# Patient Record
Sex: FEMALE | Race: WHITE | Employment: STUDENT | URBAN - METROPOLITAN AREA
[De-identification: names, ages, dates, MRNs, and addresses within clinical notes are randomized per-mention and may not be internally consistent; named-entity substitution may affect disease eponyms.]

---

## 2017-04-05 ENCOUNTER — VIRTUAL VISIT (OUTPATIENT)
Dept: FAMILY MEDICINE | Facility: OTHER | Age: 32
End: 2017-04-05

## 2017-04-05 NOTE — PROGRESS NOTES
"Date:   Clinician: Opal Hernandez  Clinician NPI: [id number]  Patient: Vasquez Ortiz  Patient : 1985  Patient Address: [facility] [place] [address]  Patient Phone: [phone]  Visit Protocol: URI  Patient Summary:  Vasquez is a 31 year old ( : 1985 ) female who initiated a Zip for evaluation of bronchitis. When asked the question \"Do you have a [place] primary care physician?\", Vasquez responded \"I don't know\".     Her symptoms started gradually 48 hours ago and consist of cough, hoarse voice, post-nasal drainage, and sore throat.   She denies itchy eyes, rhinitis, headache, myalgias, dysphagia, dyspnea, vomiting, nausea, nasal congestion, chest pain, ear pain, petechial or purpuric rash, chills, facial pain or pressure, loss of appetite, fever, and malaise. She denies a history of facial surgery.    She has a moderately painful sore throat. The patient denies having white spots on the tonsils similar to a sample strep throat image provided. She has not been exposed to Strep. When asked to feel her neck she reported enlarged lymph nodes. Vasquez noted that the enlarged neck lymph nodes developed AFTER the onset of upper respiratory symptoms. She denies axillary lymphadenopathy.   Her moderately severe (cough every 5-10 minutes) productive cough is more bothersome at night. She believes the cough is caused by post-nasal drainage. Her cough produces green sputum. She experiences mild wheezing on a regular basis.    Vasquez denies having COPD or other chronic lung disease.   Pulse: self-reported pulse rate as: 13 beats in 10 seconds.    Weight (in lbs): 105   She states she is not pregnant and denies breastfeeding. She has menstruated in the past month.   Vasquez does not smoke or use smokeless tobacco.    MEDICATIONS:  No current medications , ALLERGIES:  NKDA   Clinician Response:  Dear Vasquez,  Based on the information you have provided, you likely have viral bronchitis.   I am " prescribing benzonatate (Tessalon Perles) 100 mg to treat your cough. Take one to two tablets by mouth three times a day as needed for cough. There are no refills with this prescription.   Unless your are allergic to the over-the-counter medication(s) below, I recommend using:   Acetaminophen (Tylenol), which helps to reduce your discomfort and fever. Take 1-2 pills every 4-6 hours. This is an over-the-counter medication that does not require a prescription.   Ibuprofen. Take 1-3 200mg tablets (200-600mg) every 8 hours to help with the discomfort. Make sure to take the ibuprofen with food. Do not exceed 2400mg in 24 hours. This is an over-the-counter medication that does not require a prescription.   Try the following to help with your throat pain and discomfort:     Use throat lozenges    Gargle with warm salt water (1/4 teaspoon of salt per 8 ounce glass of water).    Suck on frozen items such as Popsicles or ice cubes.     Call 911 or go to the emergency room if you feel that your throat is closing off, you suddenly develop a rash, you are unable to swallow fluids, you are drooling, or you are having difficulty breathing.  Follow up with your primary care clinician if your symptoms are not improving in 3-4 days.   Because your condition is most likely caused by a virus, antibiotics will not help you get better. Inappropriately treating a viral infection with antibiotics may cause harmful side-effects. In fact, antibiotics may make you feel worse.  For more information on why I am not prescribing antibiotics, please watch this video: Antibiotics Won't Help You.   Drink plenty of liquids, especially water and take time to rest your body. This may mean taking a nap or going to bed earlier. Your body is fighting a virus and liquids and rest will improve the pace of recovery. Remember to regularly wash your hands and avoid close contact with others to prevent spreading your condition.   Diagnosis: Viral Bronchitis  possible  Diagnosis ICD: J40  Prescription: benzonatate (Tessalon Perles) 100mg oral tablet 30 tablets, 5 days supply. Take one to two tablets by mouth three times a day as needed. disp. 30. Refills: 0, Refill as needed: no, Allow substitutions: yes

## 2019-03-18 ENCOUNTER — TELEPHONE (OUTPATIENT)
Dept: PSYCHIATRY | Facility: CLINIC | Age: 34
End: 2019-03-18

## 2019-03-19 ENCOUNTER — TELEPHONE (OUTPATIENT)
Dept: BEHAVIORAL HEALTH | Facility: CLINIC | Age: 34
End: 2019-03-19

## 2019-03-19 ENCOUNTER — OFFICE VISIT (OUTPATIENT)
Dept: PSYCHIATRY | Facility: CLINIC | Age: 34
End: 2019-03-19
Attending: PSYCHIATRY & NEUROLOGY
Payer: COMMERCIAL

## 2019-03-19 ENCOUNTER — HOSPITAL ENCOUNTER (INPATIENT)
Facility: CLINIC | Age: 34
LOS: 7 days | Discharge: HOME | DRG: 885 | End: 2019-03-26
Attending: EMERGENCY MEDICINE | Admitting: PSYCHIATRY & NEUROLOGY
Payer: COMMERCIAL

## 2019-03-19 VITALS — SYSTOLIC BLOOD PRESSURE: 125 MMHG | HEART RATE: 98 BPM | DIASTOLIC BLOOD PRESSURE: 90 MMHG

## 2019-03-19 DIAGNOSIS — R45.1 AGITATION: ICD-10-CM

## 2019-03-19 DIAGNOSIS — F31.60 BIPOLAR AFFECTIVE DISORDER, CURRENT EPISODE MIXED, CURRENT EPISODE SEVERITY UNSPECIFIED (H): Primary | ICD-10-CM

## 2019-03-19 DIAGNOSIS — Z72.89 SELF-INJURIOUS BEHAVIOR: ICD-10-CM

## 2019-03-19 DIAGNOSIS — F30.9 MANIA (H): Primary | ICD-10-CM

## 2019-03-19 LAB
ALBUMIN SERPL-MCNC: 4.6 G/DL (ref 3.4–5)
ALP SERPL-CCNC: 90 U/L (ref 40–150)
ALT SERPL W P-5'-P-CCNC: 24 U/L (ref 0–50)
AMPHETAMINES UR QL SCN: NEGATIVE
ANION GAP SERPL CALCULATED.3IONS-SCNC: 6 MMOL/L (ref 3–14)
AST SERPL W P-5'-P-CCNC: 19 U/L (ref 0–45)
BARBITURATES UR QL: NEGATIVE
BASOPHILS # BLD AUTO: 0 10E9/L (ref 0–0.2)
BASOPHILS NFR BLD AUTO: 0.6 %
BENZODIAZ UR QL: NEGATIVE
BILIRUB SERPL-MCNC: 0.4 MG/DL (ref 0.2–1.3)
BUN SERPL-MCNC: 5 MG/DL (ref 7–30)
CALCIUM SERPL-MCNC: 9.4 MG/DL (ref 8.5–10.1)
CANNABINOIDS UR QL SCN: NEGATIVE
CHLORIDE SERPL-SCNC: 105 MMOL/L (ref 94–109)
CO2 SERPL-SCNC: 31 MMOL/L (ref 20–32)
COCAINE UR QL: NEGATIVE
CREAT SERPL-MCNC: 0.79 MG/DL (ref 0.52–1.04)
DIFFERENTIAL METHOD BLD: NORMAL
EOSINOPHIL # BLD AUTO: 0.1 10E9/L (ref 0–0.7)
EOSINOPHIL NFR BLD AUTO: 1.8 %
ERYTHROCYTE [DISTWIDTH] IN BLOOD BY AUTOMATED COUNT: 12.4 % (ref 10–15)
ETHANOL UR QL SCN: NEGATIVE
GFR SERPL CREATININE-BSD FRML MDRD: >90 ML/MIN/{1.73_M2}
GLUCOSE SERPL-MCNC: 77 MG/DL (ref 70–99)
HCG UR QL: NEGATIVE
HCT VFR BLD AUTO: 41.1 % (ref 35–47)
HGB BLD-MCNC: 13.4 G/DL (ref 11.7–15.7)
IMM GRANULOCYTES # BLD: 0 10E9/L (ref 0–0.4)
IMM GRANULOCYTES NFR BLD: 0 %
LYMPHOCYTES # BLD AUTO: 1.8 10E9/L (ref 0.8–5.3)
LYMPHOCYTES NFR BLD AUTO: 35.4 %
MCH RBC QN AUTO: 30 PG (ref 26.5–33)
MCHC RBC AUTO-ENTMCNC: 32.6 G/DL (ref 31.5–36.5)
MCV RBC AUTO: 92 FL (ref 78–100)
MONOCYTES # BLD AUTO: 0.5 10E9/L (ref 0–1.3)
MONOCYTES NFR BLD AUTO: 9.7 %
NEUTROPHILS # BLD AUTO: 2.7 10E9/L (ref 1.6–8.3)
NEUTROPHILS NFR BLD AUTO: 52.5 %
NRBC # BLD AUTO: 0 10*3/UL
NRBC BLD AUTO-RTO: 0 /100
OPIATES UR QL SCN: NEGATIVE
PLATELET # BLD AUTO: 197 10E9/L (ref 150–450)
POTASSIUM SERPL-SCNC: 4 MMOL/L (ref 3.4–5.3)
PROT SERPL-MCNC: 7.7 G/DL (ref 6.8–8.8)
RBC # BLD AUTO: 4.46 10E12/L (ref 3.8–5.2)
SODIUM SERPL-SCNC: 142 MMOL/L (ref 133–144)
WBC # BLD AUTO: 5.1 10E9/L (ref 4–11)

## 2019-03-19 PROCEDURE — 81025 URINE PREGNANCY TEST: CPT | Performed by: FAMILY MEDICINE

## 2019-03-19 PROCEDURE — 12400001 ZZH R&B MH UMMC

## 2019-03-19 PROCEDURE — 80307 DRUG TEST PRSMV CHEM ANLYZR: CPT | Performed by: FAMILY MEDICINE

## 2019-03-19 PROCEDURE — 85025 COMPLETE CBC W/AUTO DIFF WBC: CPT | Performed by: EMERGENCY MEDICINE

## 2019-03-19 PROCEDURE — 80053 COMPREHEN METABOLIC PANEL: CPT | Performed by: EMERGENCY MEDICINE

## 2019-03-19 PROCEDURE — 99284 EMERGENCY DEPT VISIT MOD MDM: CPT | Mod: Z6 | Performed by: EMERGENCY MEDICINE

## 2019-03-19 PROCEDURE — 80320 DRUG SCREEN QUANTALCOHOLS: CPT | Performed by: FAMILY MEDICINE

## 2019-03-19 PROCEDURE — 99285 EMERGENCY DEPT VISIT HI MDM: CPT | Mod: 25

## 2019-03-19 RX ORDER — BENZTROPINE MESYLATE 0.5 MG/1
0.5 TABLET ORAL 2 TIMES DAILY PRN
Status: DISCONTINUED | OUTPATIENT
Start: 2019-03-19 | End: 2019-03-26 | Stop reason: HOSPADM

## 2019-03-19 RX ORDER — DIPHENHYDRAMINE HYDROCHLORIDE 50 MG/ML
50 INJECTION INTRAMUSCULAR; INTRAVENOUS EVERY 6 HOURS PRN
Status: DISCONTINUED | OUTPATIENT
Start: 2019-03-19 | End: 2019-03-21

## 2019-03-19 RX ORDER — TRAZODONE HYDROCHLORIDE 50 MG/1
50 TABLET, FILM COATED ORAL
Status: DISCONTINUED | OUTPATIENT
Start: 2019-03-19 | End: 2019-03-26 | Stop reason: HOSPADM

## 2019-03-19 RX ORDER — HYDROXYZINE HYDROCHLORIDE 25 MG/1
25 TABLET, FILM COATED ORAL EVERY 4 HOURS PRN
Status: DISCONTINUED | OUTPATIENT
Start: 2019-03-19 | End: 2019-03-26 | Stop reason: HOSPADM

## 2019-03-19 RX ORDER — LAMOTRIGINE 150 MG/1
175 TABLET ORAL 2 TIMES DAILY
Status: ON HOLD | COMMUNITY
End: 2019-03-26

## 2019-03-19 RX ORDER — LAMOTRIGINE 100 MG/1
350 TABLET ORAL DAILY
COMMUNITY
End: 2019-03-19

## 2019-03-19 RX ORDER — HALOPERIDOL 2 MG/1
2 TABLET ORAL AT BEDTIME
Qty: 14 TABLET | Refills: 0 | Status: ON HOLD | OUTPATIENT
Start: 2019-03-19 | End: 2019-03-26

## 2019-03-19 RX ORDER — HALOPERIDOL 1 MG/1
2 TABLET ORAL EVERY 6 HOURS PRN
Status: DISCONTINUED | OUTPATIENT
Start: 2019-03-19 | End: 2019-03-26 | Stop reason: HOSPADM

## 2019-03-19 RX ORDER — DIAZEPAM 5 MG
5-20 TABLET ORAL EVERY 30 MIN PRN
Status: DISCONTINUED | OUTPATIENT
Start: 2019-03-19 | End: 2019-03-20

## 2019-03-19 RX ORDER — DEXTROAMPHETAMINE SACCHARATE, AMPHETAMINE ASPARTATE, DEXTROAMPHETAMINE SULFATE AND AMPHETAMINE SULFATE 1.25; 1.25; 1.25; 1.25 MG/1; MG/1; MG/1; MG/1
10 TABLET ORAL DAILY PRN
Status: ON HOLD | COMMUNITY
End: 2019-03-26

## 2019-03-19 RX ORDER — ACETAMINOPHEN 325 MG/1
650 TABLET ORAL EVERY 4 HOURS PRN
Status: DISCONTINUED | OUTPATIENT
Start: 2019-03-19 | End: 2019-03-26 | Stop reason: HOSPADM

## 2019-03-19 RX ORDER — CLONAZEPAM 1 MG/1
1-1.5 TABLET ORAL DAILY PRN
Status: ON HOLD | COMMUNITY
End: 2019-03-26

## 2019-03-19 RX ORDER — HALOPERIDOL 0.5 MG/1
2 TABLET ORAL AT BEDTIME
Status: DISCONTINUED | OUTPATIENT
Start: 2019-03-19 | End: 2019-03-20

## 2019-03-19 RX ORDER — CLONAZEPAM 0.5 MG/1
0.5 TABLET ORAL AT BEDTIME
Status: DISCONTINUED | OUTPATIENT
Start: 2019-03-19 | End: 2019-03-26 | Stop reason: HOSPADM

## 2019-03-19 RX ORDER — HALOPERIDOL 5 MG/ML
5 INJECTION INTRAMUSCULAR EVERY 6 HOURS PRN
Status: DISCONTINUED | OUTPATIENT
Start: 2019-03-19 | End: 2019-03-26 | Stop reason: HOSPADM

## 2019-03-19 SDOH — HEALTH STABILITY: MENTAL HEALTH: HOW OFTEN DO YOU HAVE A DRINK CONTAINING ALCOHOL?: NEVER

## 2019-03-19 ASSESSMENT — ENCOUNTER SYMPTOMS
VOMITING: 0
ABDOMINAL PAIN: 0
AGITATION: 1
SHORTNESS OF BREATH: 0
FEVER: 0
HALLUCINATIONS: 0
NERVOUS/ANXIOUS: 1

## 2019-03-19 ASSESSMENT — MIFFLIN-ST. JEOR: SCORE: 1107.78

## 2019-03-19 NOTE — LETTER
March 19, 2019      RE: Vasquez Ortiz         To whom it may concern,    Vasquez Ortiz is a patient of mine. She is currently experiencing health problems that make her unable to participate in school activities. I advised her that, at a minimum, she needs to break from school for the remainder of the week (March 18 through 22). We meet again later this week to re-evaluate, as there is a possibility she will need more time off.    Thank you for your consideration and for accommodating Vasquez's needs.    Sincerely,          George Monteiro MD, MPH

## 2019-03-19 NOTE — LETTER
To whom this may concern,    Vasquez Ortiz was hospitalized at the [facility] from 3/19/2019 to 3/26/2019 for a medical condition. She will be able to return back to her schooling and clinical activities without restrictions on 4/1/2019.             Matthew Mir MD

## 2019-03-19 NOTE — PATIENT INSTRUCTIONS
- Start taking Haldol 2mg at bedtime.  - Continue taking lamotrigine. Clonazepam is also okay, but this is not a long term solution, as it long term use can lead to decreased effectiveness and addiction.  - Follow-up with me on Thursday at 10:30AM.  - Ideally a parent would be with you for the next several days (eg the next week).  - Please take a break from school for the remainder of the week. Focus on sleep and taking care of your mental health.    If for any reason you feel in danger of harming yourself, then come to the emergency room and/or call 911.    Thank you for coming to the PSYCHIATRY CLINIC.    Lab Testing:  If you had lab testing today and your results are reassuring or normal they will be mailed to you or sent through [facility] within 7 days.   If the lab tests need quick action we will call you with the results.  The phone number we will call with results is # [phone] (home) . If this is not the best number please call our clinic and change the number.    Medication Refills:  If you need any refills please call your pharmacy and they will contact us. Our fax number for refills is [phone]. Please allow three business for refill processing.   If you need to  your refill at a new pharmacy, please contact the new pharmacy directly. The new pharmacy will help you get your medications transferred.     Scheduling:  If you have any concerns about today's visit or wish to schedule another appointment please call our office during normal business hours [phone] (8-5:00 M-F)    Contact Us:  Please call [phone] during business hours (8-5:00 M-F).  If after clinic hours, or on the weekend, please call  [phone].    Financial Assistance [phone]  [facility] Billing [phone]  [facility]illing [phone]  Medical Records [phone]      MENTAL HEALTH CRISIS NUMBERS:  [facility]:   [facility] - [phone]   Crisis Residence [facility] - [facility] -  [phone]   Walk-In Counseling Center [facility] - [phone]   COPE 24/7 [facility] Mobile Team for Adults - [[phone]]; Child - [[phone]]        [facility]:   [facility] - [phone]   Walk-in counseling [facility] - [phone]   Walk-in counseling [facility] - [phone]   Crisis Residence [facility] - [phone]   Urgent Care Adult Mental Health:   --Drop-in, 24/7 crisis line, and [facility] Mobile Team [[phone]]    CRISIS TEXT LINE: Text [phone] from anywhere, anytime, any crisis 24/7;    OR SEE [email or web address]     Poison Control Center - [phone]    CHILD: [facility] needs assessment team - [phone]     [facility] Lifeline - [phone]; or [facility] Lifeline - [phone]    If you have a medical emergency please call 911or go to the nearest ER.                    _____________________________________________    Again thank you for choosing PSYCHIATRY CLINIC and please let us know how we can best partner with you to improve you and your family's health.  You may be receiving a survey in the mail regarding this appointment. We would love to have your feedback, both positive and negative, so please fill out the survey and return it using the provided envelope. The survey is done by an external company, so your answers are anonymous.
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
## 2019-03-19 NOTE — ED NOTES
Purged recently. Worsening impulse to self harm. Cut self with razor superficially this weekend. Using her rx'd clonopin regularly. Recent  Increase in her lamictal

## 2019-03-19 NOTE — ED PROVIDER NOTES
History     Chief Complaint   Patient presents with     Self Injury     cutting self, purged, attempted self harm a few times over the past few weeks; sent to ED from psych appt. Denies SI.     HPI  Drew Sambol is a 33 year old female who presents emergency department for medical clearance prior to psychiatric admission.  Patient was seen in psychiatry clinic today and they recommended admission for significant agitation, impulsive behavior, recent cutting.  Patient purged one time within the last 1-2 weeks.  She cut herself on her right lower leg with a razor 3 days ago.   had to restrain her to prevent her from cutting 10 days ago.  Patient reports recent panic attacks, feeling mood lability, significant anxiety, but denies suicidal ideation, homicidal ideation, auditory or visual hallucinations.  Denies significant alcohol or drug use.   provides much of the history as well.    I have reviewed the Medications, Allergies, Past Medical and Surgical History, and Social History in the Epic system.    History reviewed. No pertinent past medical history.    History reviewed. No pertinent surgical history.    History reviewed. No pertinent family history.    Social History     Tobacco Use     Smoking status: Former Smoker     Smokeless tobacco: Never Used   Substance Use Topics     Alcohol use: Yes     Frequency: Never     Comment: once a week       Review of Systems   Constitutional: Negative for fever.   Respiratory: Negative for shortness of breath.    Cardiovascular: Negative for chest pain.   Gastrointestinal: Negative for abdominal pain and vomiting.   Psychiatric/Behavioral: Positive for agitation and self-injury. Negative for hallucinations and suicidal ideas. The patient is nervous/anxious.        Physical Exam   BP: 109/79  Pulse: 91  Temp: 97.8  F (36.6  C)  Resp: 16  SpO2: 99 %      Physical Exam    GEN:  Alert, well developed, no acute distress  HEENT:  PERRL, EOMI, Mucous membranes are  moist.   Cardio:  RRR, no murmur, radial pulses equal bilaterally  PULM:  Lungs clear, good air movement, no wheezes, rales   Back exam:  No CVA tenderness  Musculoskeletal:  normal range of motion, no lower extremity swelling or calf tenderness  Neuro:  Alert and oriented X3, Follows commands, moving all extremities spontaneously   Skin:  Warm, dry, superficial cuts/abrasions on right lower leg, lateral aspect.  Psychiatric: Appears anxious, no suicidal ideation, homicidal ideation, auditory or visual hallucinations.  ED Course        Procedures             Critical Care time:  none  Labs are normal except as shown.    Labs Ordered and Resulted from Time of ED Arrival Up to the Time of Departure from the ED   COMPREHENSIVE METABOLIC PANEL - Abnormal; Notable for the following components:       Result Value    Urea Nitrogen 5 (*)     All other components within normal limits   DRUG ABUSE SCREEN 6 CHEM DEP URINE ([facility])   HCG QUALITATIVE URINE   CBC WITH PLATELETS DIFFERENTIAL            Assessments & Plan (with Medical Decision Making)   Patient was sent to the emergency department for medical clearance prior to psychiatric admission.  Please see the psychiatry clinic note from earlier today for further details regarding her psychiatric evaluation.  She has no acute medical problems that would prevent admission to psychiatry.  She is medically cleared for psychiatric admission.    I have reviewed the nursing notes.    I have reviewed the findings, diagnosis, plan and need for follow up with the patient.       Medication List      There are no discharge medications for this visit.         Final diagnoses:   Agitation       3/19/2019   [facility], [facility], EMERGENCY DEPARTMENT     Argelia Pantoja MD  03/19/19 [id number]

## 2019-03-19 NOTE — TELEPHONE ENCOUNTER
"S: Dr Monteiro called seeking a direct psych admit for pt who is currently in the Psych clinic, presenting w/ \"possible virgilio\".  B: Pt has been superficially cutting her leg. Pt has been \"overusing\" her klonopin which is rx'ed to her. No hx of psych admits. Hx of an eating d/o. No chronic med prob's.  A: denies SI, coop, vol, \"possibly manic\",   R: author informed Dr NORIEGA that intake will check bed availability and will call him back. mbw  Dr Monteiro called back saying pt purged a few days ago after not having done so in years. He said he now wants to have pt go through the er due to her cutting and purging. Pt needs MED CLEARANCE ONLY in the er, per Dr NORIEGA. Dr NORIEGA was informed that we currently are at [facility]. mbw  "

## 2019-03-20 LAB
CHOLEST SERPL-MCNC: 187 MG/DL
HDLC SERPL-MCNC: 89 MG/DL
LDLC SERPL CALC-MCNC: 85 MG/DL
NONHDLC SERPL-MCNC: 98 MG/DL
TRIGL SERPL-MCNC: 63 MG/DL
TSH SERPL DL<=0.005 MIU/L-ACNC: 3.64 MU/L (ref 0.4–4)

## 2019-03-20 PROCEDURE — 84443 ASSAY THYROID STIM HORMONE: CPT

## 2019-03-20 PROCEDURE — 80061 LIPID PANEL: CPT

## 2019-03-20 PROCEDURE — 99223 1ST HOSP IP/OBS HIGH 75: CPT | Mod: AI | Performed by: PSYCHIATRY & NEUROLOGY

## 2019-03-20 PROCEDURE — 93005 ELECTROCARDIOGRAM TRACING: CPT

## 2019-03-20 PROCEDURE — 25000132 ZZH RX MED GY IP 250 OP 250 PS 637

## 2019-03-20 PROCEDURE — 93010 ELECTROCARDIOGRAM REPORT: CPT | Performed by: INTERNAL MEDICINE

## 2019-03-20 PROCEDURE — 25000132 ZZH RX MED GY IP 250 OP 250 PS 637: Performed by: STUDENT IN AN ORGANIZED HEALTH CARE EDUCATION/TRAINING PROGRAM

## 2019-03-20 PROCEDURE — 36415 COLL VENOUS BLD VENIPUNCTURE: CPT | Performed by: STUDENT IN AN ORGANIZED HEALTH CARE EDUCATION/TRAINING PROGRAM

## 2019-03-20 PROCEDURE — 12400001 ZZH R&B MH UMMC

## 2019-03-20 PROCEDURE — 36415 COLL VENOUS BLD VENIPUNCTURE: CPT

## 2019-03-20 PROCEDURE — 80175 DRUG SCREEN QUAN LAMOTRIGINE: CPT | Performed by: STUDENT IN AN ORGANIZED HEALTH CARE EDUCATION/TRAINING PROGRAM

## 2019-03-20 RX ORDER — LITHIUM CARBONATE 300 MG/1
600 TABLET, FILM COATED, EXTENDED RELEASE ORAL AT BEDTIME
Status: DISCONTINUED | OUTPATIENT
Start: 2019-03-20 | End: 2019-03-26 | Stop reason: HOSPADM

## 2019-03-20 RX ADMIN — HYDROXYZINE HYDROCHLORIDE 25 MG: 25 TABLET ORAL at 16:28

## 2019-03-20 RX ADMIN — HALOPERIDOL 2 MG: 0.5 TABLET ORAL at 01:09

## 2019-03-20 RX ADMIN — LITHIUM CARBONATE 600 MG: 300 TABLET, EXTENDED RELEASE ORAL at 22:04

## 2019-03-20 RX ADMIN — CLONAZEPAM 0.5 MG: 0.5 TABLET ORAL at 01:09

## 2019-03-20 RX ADMIN — CLONAZEPAM 0.5 MG: 0.5 TABLET ORAL at 22:04

## 2019-03-20 RX ADMIN — LAMOTRIGINE 175 MG: 150 TABLET ORAL at 22:04

## 2019-03-20 RX ADMIN — LAMOTRIGINE 175 MG: 150 TABLET ORAL at 09:23

## 2019-03-20 RX ADMIN — LAMOTRIGINE 175 MG: 150 TABLET ORAL at 01:08

## 2019-03-20 ASSESSMENT — ACTIVITIES OF DAILY LIVING (ADL)
HYGIENE/GROOMING: INDEPENDENT
TRANSFERRING: 0-->INDEPENDENT
HYGIENE/GROOMING: INDEPENDENT
TOILETING: 0-->INDEPENDENT
RETIRED_COMMUNICATION: 0-->UNDERSTANDS/COMMUNICATES WITHOUT DIFFICULTY
ORAL_HYGIENE: INDEPENDENT
DRESS: INDEPENDENT
RETIRED_EATING: 0-->INDEPENDENT
DRESS: INDEPENDENT
ORAL_HYGIENE: INDEPENDENT
COGNITION: 0 - NO COGNITION ISSUES REPORTED
BATHING: 0-->INDEPENDENT
LAUNDRY: WITH SUPERVISION
FALL_HISTORY_WITHIN_LAST_SIX_MONTHS: NO
LAUNDRY: WITH SUPERVISION
SWALLOWING: 0-->SWALLOWS FOODS/LIQUIDS WITHOUT DIFFICULTY
DRESS: INDEPENDENT
DRESS: 0-->INDEPENDENT
ORAL_HYGIENE: INDEPENDENT
AMBULATION: 0-->INDEPENDENT

## 2019-03-20 NOTE — H&P
"  -----------------------------------------------------------------------------------------------------------  Psychiatry History & Physical      Vasquez Ortiz MRN# [id number]   Age: 33 year old YOB: 1985     Date of Admission: 3/19/2019     Interviewed at 9pm         Contacts:   Primary Outpatient Psychiatrist: Dr. Bermudez (Psychiatrist in [place]), and Dr. Monteiro at [facility]   Family Members: Johnny Soares/ - [phone]  - patient signed an TOMASA         Chief Complaint:   \"anger and crying\"         History of Present Illness:   History obtained from patient interview, chart review.      Vasquez Ortiz is a 33-year-old female with a history of depression, possible OCD, and anorexia nervosa, who presents with 2 weeks of agitation, irritability, mood lability, SIB urges, and crying spells.     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  She states stated that the last 8 or 9 months have been \"wonderful\" until a few weeks ago, when she began having worsening anxiety and irritability.  She had a hard time participating in class, as she had to stop herself from interrupting and during the conversation.  She noted that she is \"pissed off\" easily from her peers with little provocation, which is new for her.    Approximately 2 weeks ago, the patient had an incident of aggression in the context of a minor disagreement with her .  After she told him that was she was dizzy, he abruptly told her \"sit down\".  She got very close to his face in an aggressive manner, which is out of her character.  Her  noted that she had \"jumped up with force\". She charged towards the bathroom, and her  felt that she was going to obtain something to cut herself with.  He had to physically stop her from entering the bathroom, even after long negotiation and attempts to de-escalate the situation.  Well she was on the " "ground, she accidentally kicked a framed poster that they have.  The sound of breaking the poster \"knocked her out of it\", and she she was able to take a warm bath to calm down.  After she got into the bath, she asked \"what was that? Am I crazy?\".  She has had panic attacks in the past, but she stated that her previous panic attacks felt different.  Looking back in the situation, she said that she felt \"possessed\".    She has a history of anorexia nervosa, which has been stable for the last 8 years.  She had one episode of purging recently, which she stated she did to prevent herself from self-harming.  She has lost a few pounds in the last few weeks.  She again engaged in cutting 2 days ago, something she has not done in many years.  She is prescribed Klonopin 0.5 mg daily as needed, but she has been using 1-1.5 mg.  She has been sleeping 8-9 hours a night, with only a few nights that she only slept 5 hours due to social engagements.    Regarding her psychiatric history, the patient had an episode of hypersexuality resulting in expulsion from school, bullying, and social isolation at age 15 or 16.  She was put on Prozac for depression at that time. She reports mood cycling after starting fluoxetine, with a 2 week periods of feeling \"great\", and then later crashing and using cutting as a coping too. The dose of prozac was increased, but her symptoms persisted. She eventually self-discontinued the Prozac. She had a similar episode again in 2012 when she was prescribed Cymbalta for depressive symptoms.  She lost friendships at that time, and fired her therapist.  The Cymbalta was stopped, and she was started on Lamictal which markedly improved her symptoms.  The patient and her , who is a PhD psychology intern with expertise in psychotic illnesses, felt that she is having a mixed episode.    The patient was seen in clinic today to establish care with a new psychiatrist, Dr. Hinton.  There was a tentative " "plan to start Haldol and continue her Lamictal.  She has also been working with her long-time psychiatrist in [place], who had a plan to increase her Lamictal 200 mg BID. Given her new mood symptoms and recent self-injurious behavior, she was referred to the ED for inpatient admission.    The risks, benefits, alternatives and side effects have been discussed and are understood by the patient and other caregivers.       Psychiatric Review of Systems:   Depression:  Reports: depressed mood, guilt, hopelessness, helplessness, impaired concentration,irritability.   Denies: suicidal ideation, changes in sleep.  Elena:   Reports: racing thoughts, increased talkativeness  Denies: sleeplessness, impulsiveness (spending, driving recklessly, change in sexual activity), grandiose delusions.  Psychosis:   Denies: visual hallucinations, auditory hallucinations  Anxiety:   Reports: worries   PTSD: Did not assess  OCD: Did not assess  ED:   Reports: One episode of recent purging, recent weight loss after a 8 year period of stability.          Medical Review of Systems:   The Review of Systems is negative other than noted in the HPI         Psychiatric History:     Prior Diagnoses: Depression, anorexia nervosa, possible OCD    Past Hospitalizations: none    Prior use of Psychotropic Medication: Prozac, Cymbalta, Klonopin, Lamictal    ECT/TMS: None    Court Commitment: None    Suicide attempts: Did not assess    Self-injurious behavior: Cut in teenage years, no cutting for many years until recently           Substance Use History:     Nicotine: Smoked cigarettes in HS and some in college; current nonsmoker    Alcohol: Drink \"a lot\" when first moved to [place], but minimal alcohol intake currently           Cannabis: None currently; caused paranoia in the past. Does use CBD oil    Others: Experimented with cocaine and psilocybin in the past    Prior CD treatments: none         Past Medical History:   No significant PMHX         Allergies:    " "  Allergies   Allergen Reactions     Duloxetine Other (See Comments)     hypomania          Medications:     Current Outpatient Medications   Medication Sig Dispense Refill     amphetamine-dextroamphetamine (ADDERALL) 5 MG tablet Take 10 mg by mouth daily as needed (focus)        clonazePAM (KLONOPIN) 1 MG tablet Take 1-1.5 mg by mouth daily as needed for anxiety        lamoTRIgine (LAMICTAL) 150 MG tablet Take 175 mg by mouth 2 times daily       haloperidol (HALDOL) 2 MG tablet Take 1 tablet (2 mg) by mouth At Bedtime for 14 days 14 tablet 0           Social History:     Upbringing: Did not assess    Family/Relationships:  to her  for the past 12 years    Living Situation: Currently living in an apartment in [place]    Education: Currently in school to become a massage therapist    Occupation: 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 Monteiro's intake note:   Paternal uncle with OCD and h/o psychiatric hospitalization  Depression on both sides of family  Gma abused \"pain pills\" and EtOH and likely had eating disorder, had depression  Doesn't know if family hx of Schizophrenia and Bipolar         Labs:     UTox negative  Urine HCG negative  CBC wnl  CMP wnl         Psychiatric Examination:     /79   Pulse 91   Temp 97.8  F (36.6  C) (Oral)   Resp 16   SpO2 99%     Appearance:  Thin female, appears as stated age, casually dressed, adequately groomed  Attitude:  cooperative  Eye Contact:  fair  Mood:  \"anxious, sad, and angry\"  Affect:  Mood congruent, frequently tearful  Speech:  clear, coherent, hyperverbal  Psychomotor Behavior:  no evidence of tardive dyskinesia, dystonia, or tics  Thought Process: Circumstantial  Associations:  no loose associations  Thought Content:  Denied SI; no evidence of psychotic thought  Insight:  " fair  Judgment:  fair  Oriented to:  Grossly intact  Attention Span and Concentration:  fair  Recent and Remote Memory:  fair  Language:  english with appropriate syntax and vocabulary  Fund of Knowledge: appropriate  Muscle Strength and Tone: Grossly normal  Gait and Station: Did not assess         Physical Exam:     See ED assessment note by Dr. Pantoja on 03/19/2019          Assessment   Vasquez Ortiz is a 33-year-old female with a history of depression, possible OCD, anorexia nervosa, who presents with 2 weeks of mood lability and recent self-harm behaviors. Significant symptoms include irritability, labile mood with crying spells, racing thoughts, increased talkativeness, and agitation. Patient had a similar episode, although to a lesser severity, in 2012 in the context of starting Duloxetine, which resolved with Lamictal. Unclear if the patient has a trauma history. MSE notable for a thin female with dysphoric mood and affect, frequently crying with little provocation, with hyper verbal speech and circumstantial thought process. Differential includes current mixed episode vs mood dysregulation.     Will start scheduled Haldol 2 mg HS, as initially planned in outpatient clinic. Patient has been overusing her Klonopin. Will start with Klonopin 0.5 mg HS, and defer to primary team for future dose changes. Will also place on MSSA protocol for the evening in the event she has benzo withdrawal.     Reason for inpatient hospitalization is warranted due to possible mixed episode and SIB. Disposition pending clinical stabilization, medication optimization, and formulation of safe discharge plan.        Plan   Admit to [address] with Attending Physician Dr. Stokes. To be staffed by Dr. Stokes in the AM.     Principal psychiatric diagnosis:   # Bipolar 1, current episode mixed, vs emotional dysregulation    Secondary psychiatric diagnoses:   # Hx of anorexia nervosa  # R/o OCD    Medications:   New:  - Haldol 2 mg HS  - Cogentin  0.5 mg BID prn  - Haldol 2 mg PO/5 mg IM + benadryl 50 mg IM q6 hours prn for behavioral emergencies    - Valium per MSSA  - Hydroxyzine 25 mg q4 hours prn for anxiety  - Trazodone 50 mg HS prn for insomnia    Continued:  - Lamictal 175 mg BID (increased 8 days ago, outpatient plan to increase to 200 mg BID)  - Klonopin 0.5 mg HS    Held:    Laboratory/Imaging: TSH, lipid panel    Relevant psychosocial stressors: Move to [place] from [place], career change    Legal Status: Voluntary    Safety Assessment:    - Q 15 minute checks  - SIB precautions      - Patient will be treated in therapeutic milieu with appropriate individual and group therapies.  - medications as above    Medical diagnoses:  None         Dispo: unknown at this time; pending medication management and clinical stabilization    --------------------------------------------------------  Calvin Blair MD  Psychiatry PGY-2    Attending Admission Attestation Note:  personally interviewed and examined Vasquez on March 20, 2019, I reviewed the admission history/examination and other documents related to the admission.  I confirmed the findings in the admission note and I agree with the diagnosis and treatment plan with the following corrections, clarifications, additional findings, and assessments: I certify that the treatment plan was reviewed and approved or developed by me in accordance with standard psychiatric practice. I certifiy that the inpatient services were ordered in accordance with the Medicare regulations governing the order. This includes certification that hospital inpatient services are reasonable and necessary and in the case of services not specified as inpatient-only under 42 .22(n), that they are appropriately provided as inpatient services in accordance with the 2-midnight benchmark under 42 .3(e).     The reason for inpatient status is Bipolar Disorder. High degree of complexity due to complex diagnosis, possible medication  reaction.      Corwin Stokes M.D.  of Psychiatry  Pager: [phone]    email: [email or web address]
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
## 2019-03-20 NOTE — PLAN OF CARE
The patient specific goals include:   Patient will participate in unit programming  Patient will identify triggers and positive coping skills  Patient will take medications as prescribed by physician both for mental health and medical  Patient coached to work on coping packet    The patient identified the following reasons for hospitalization:  Mood dysregulation  Anxiety    The patient identified the following goals for discharge:  Medication management

## 2019-03-20 NOTE — PROGRESS NOTES
"CLINICAL NUTRITION SERVICES - ASSESSMENT NOTE     Nutrition Prescription    RECOMMENDATIONS FOR MDs/PROVIDERS TO ORDER:  1. Patient requesting continuation of home regimen of probiotic, Calcium, Magnesium, and Vitamin D supplements. Provider to order necessary dosing.  2. Recommend obtaining Vitamin B12 and Zinc lab values given vegan diet and some decreased intakes PTA. Supplement as needed.    Malnutrition Status:    Unable to assess due to lack of weight history    Recommendations already ordered by Registered Dietitian (RD):  Allow write-ins of PB&J, tofu, and x2 of items, within reason    Future/Additional Recommendations:  Monitor po intakes and wt trends. Consider need to add snack/supplement. Adjust flavors pending trends and pt preferences.       REASON FOR ASSESSMENT  Vasquez Ortiz is a/an 33 year old female assessed by the dietitian for Admission Nutrition Risk Screen for reduced oral intake over the last month    NUTRITION HISTORY  Vasquez reports gas and bloating with dairy, otherwise no known food allergies. Reports following a vegan diet (though not strictly) over the past 10-12 hrs. States she has not been as interested in food and describes herself as a picky eater. Was diagnosed with anorexia nervosa in college and had also struggled with purging back in high school. She states that she has not purged in \"years and years\" and has not been restricting her intakes at home. She notes one incident of purging on Sunday, which had led (along with other factors) to her admission. Per pt, she has been wanting to make sure she eats enough, especially with her recent increase in lifting weights more.   Usual intakes of late breakfast: cereal, oatmeal, or toast w/P; lunch: miso soup w/tofu, avocado; dinner: pasta or stirfry, veggies, rice, beans. Snacks on nuts, nut butter, avocados, protein shakes throughout day. She states her  occasionally encourages her to eat more and makes sure she is eating " "enough.  Takes Calcium, Magnesium, and Vitamin D supplements at home. Also takes probiotic.    CURRENT NUTRITION ORDERS  Diet: Vegan  Intake/Tolerance: Appetite fair. She states sometimes she doesn't feel like eating though d/t picky eater and mood. Did not eat breakfast d/t wanting to sleep. She states she picked at lunch. She believes she will eat well if she can get PBJ sandwiches at meals too.     LABS  BUN 5 (L)    MEDICATIONS reviewed    ANTHROPOMETRICS  Height: 157.5 cm (5' 2\")  Most Recent Weight: 44.5 kg (98 lb)    IBW: 50 kg (89% IBW)  BMI: Underweight BMI <18.5  Weight History: Per pt, UBW of 103 lb. Based on this information, wt loss of 4.9%. Time period of wt loss is unclear and no wt readings to confirm.  Wt Readings from Last 10 Encounters:   03/19/19 44.5 kg (98 lb)     Dosing Weight: 45 kg (actual)    ASSESSED NUTRITION NEEDS  Estimated Energy Needs: 7101-2465 kcals/day (30 - 35 kcals/kg )  Justification: Underweight  Estimated Protein Needs: 35-45+ grams protein/day (0.8 - 1 grams of pro/kg)  Justification: Maintenance  Estimated Fluid Needs: 1 mL/kcal, or per provider   Justification: Maintenance    PHYSICAL FINDINGS  See malnutrition section below.     MALNUTRITION  % Intake: Decreased intake does not meet criteria  % Weight Loss: Unable to assess  Subcutaneous Fat Loss: Generalized: Mild  Muscle Loss: None observed  Fluid Accumulation/Edema: None noted  Malnutrition Diagnosis: Unable to assess due to lack of weight history    NUTRITION DIAGNOSIS  Predicted inadequate nutrient intake (protein-energy) related to variable appetite as evidenced by reliance on po intakes to meet estimated needs with potential for decline.       INTERVENTIONS  Implementation  - Encouraged meals TID, goal of eating at least 75% of nutritionally adequate meal trays. Discussed vegan diet order. Notified pt that would allow write-ins of PB&J, tofu, and x2 of items, within reason. Diet office to use discretion.   - " Discussed micronutrient needs and potential need for supplements (Zinc, Iron, Vitamin B12) given vegan diet. Pt stated that past lab tests she has not been deficient in Vitamin B12 and has been high in iron.     Goals  Patient to consume % of nutritionally adequate meal trays TID, or the equivalent with supplements/snacks.     Monitoring/Evaluation  Progress toward goals will be monitored and evaluated per protocol.    Loni Grajeda RD, LD  Unit pgr: [phone]

## 2019-03-20 NOTE — ED NOTES
ED to Behavioral Floor Handoff    SITUATION  Vasquez Ortiz is a 33 year old female who speaks English and lives in a home with a spouse The patient arrived in the ED by private car from clinic with a complaint of Self Injury (cutting self, purged, attempted self harm a few times over the past few weeks; sent to ED from psych appt. Catrina VILLAGOMEZ)  .The patient's current symptoms started/worsened 3 day(s) ago and during this time the symptoms have increased.   In the ED, pt was diagnosed with   Final diagnoses:   Agitation        Initial vitals were: BP: 109/79  Pulse: 91  Temp: 97.8  F (36.6  C)  Resp: 16  SpO2: 99 %   --------  Is the patient diabetic? No   If yes, last blood glucose? --     If yes, was this treated in the ED? --  --------  Is the patient inebriated (ETOH) No or Impaired on other substances? No  MSSA done? N/A  Last MSSA score: --    Were withdrawal symptoms treated? N/A  Does the patient have a seizure history? No. If yes, date of most recent seizure--  --------  Is the patient patient experiencing suicidal ideation? denies current or recent suicidal ideation     Homicidal ideation? denies current or recent homicidal ideation or behaviors.    Self-injurious behavior/urges? reports current or recent self injurious behavior or ideation including cutting herself.  ------  Was pt aggressive in the ED No  Was a code called No  Is the pt now cooperative? Yes  -------  Meds given in ED: Medications - No data to display   Family present during ED course? Yes  Family currently present? Yes    BACKGROUND  Does the patient have a cognitive impairment or developmental disability? No  Allergies: No Known Allergies.   Social demographics are   Social History     Socioeconomic History     Marital status:      Spouse name: None     Number of children: None     Years of education: None     Highest education level: None   Occupational History     None   Social Needs     Financial resource strain: None     Food  insecurity:     Worry: None     Inability: None     Transportation needs:     Medical: None     Non-medical: None   Tobacco Use     Smoking status: Former Smoker     Smokeless tobacco: Never Used   Substance and Sexual Activity     Alcohol use: Yes     Frequency: Never     Comment: once a week     Drug use: Yes     Types: Marijuana     Comment: very very rarely     Sexual activity: None   Lifestyle     Physical activity:     Days per week: None     Minutes per session: None     Stress: None   Relationships     Social connections:     Talks on phone: None     Gets together: None     Attends Rastafarian service: None     Active member of club or organization: None     Attends meetings of clubs or organizations: None     Relationship status: None     Intimate partner violence:     Fear of current or ex partner: None     Emotionally abused: None     Physically abused: None     Forced sexual activity: None   Other Topics Concern     None   Social History Narrative     None        ASSESSMENT  Labs results   Labs Ordered and Resulted from Time of ED Arrival Up to the Time of Departure from the ED   COMPREHENSIVE METABOLIC PANEL - Abnormal; Notable for the following components:       Result Value    Urea Nitrogen 5 (*)     All other components within normal limits   DRUG ABUSE SCREEN 6 CHEM DEP URINE ([facility])   HCG QUALITATIVE URINE   CBC WITH PLATELETS DIFFERENTIAL      Imaging Studies: No results found for this or any previous visit (from the past 24 hour(s)).   Most recent vital signs /79   Pulse 91   Temp 97.8  F (36.6  C) (Oral)   Resp 16   SpO2 99%    Abnormal labs/tests/findings requiring intervention:---   Pain control: pt had none  Nausea control: pt had none    RECOMMENDATION  Are any infection precautions needed (MRSA, VRE, etc.)? No If yes, what infection? --  ---  Does the patient have mobility issues? independently. If yes, what device does the pt use? ---  ---  Is patient on 72 hour hold or commitment?  No If on 72 hour hold, have hold and rights been given to patient? N/A  Are admitting orders written if after 10 p.m. ?N/A  Tasks needing to be completed:---     Ivett Giang     [phone] [facility]

## 2019-03-20 NOTE — PROGRESS NOTES
"Admission Note:     ANGELA Ovalle is 34 yrs old female, admitted to [facility] as a voluntary admission from ED. Reason for admission as per pt:\" I feel like I've been loosing control in the last 2 weeks, I need help\"      B. This is patient's first  admission. She said she's had a partial hospitalization in the past. Patient admitted that's he has struggled with depression and anxiety since age 15. She admitted that's he started cutting self at age 15. Denied abuse. Patient reported having reaction to Cymbalta and Prozac in the past: said she was getting very happy and elated and than depressed and started cutting uncontrollably.     Patient is from [place], relocated to [place] 4 yrs ago due to her  school/education. Patient had worked in a [employer] firm, but left work last May due to the stress, long hrs, and demand for perfection. 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 She said she had a panic attack during clinical. Patient reported decreased sleep. She said she worries about her 's heart health, he has had some heart problems. She said she is very afraid that he may die. Patient reported increasing anxiety and panic in the last yr or so. She said she gets angry very fast; at times she becomes very talkative and has a difficult time to stop talking. Patient said that's he has been isolating from and avoiding her family and friends. Patient said that she has a psychiatrist back in [place], and is in contact with her. Said she was told by her [place] doctor to increase Klonopin up to 1.5 mg per day. However, she ran out of Klonopin and when she went to get a refill, the pharmacist told her that they no longer accept scripts from out of state providers. So she got upset, when to her primary provider here, however, she was told that she should not have a refill, but was given 14 days supply. It sounded like this was patient's turning point leading to current decompensation.    Patient described that " "2 weeks ago, she felt dizzy and her  told her to sit in the chair, she overreacted to this and started yelling, pulling her hair, and cutting. She said she felt out of control, her  attempted to stop her, she broke a glass. Patient said that 2 days ago, she again felt the same way and started cutting while in the shower. The next day, on Saturday, she started purging to stay in control.     CATERINA Ovalle denied wishing to be dead or suicidal thoughts or urges to cut at the time of admission. However, she said she had urges to cut few times today. Patient reported history of suicidal thoughts in the past, but never planning or intending to do anything. Denied family history of suicides.   She rated depression at 6/10, anxiety 7/10, denied HI, hallucinations, or delusions/paranoia. Patient was willing to contract for safety.   Patient is hopeful to get better. Her goal for this hospitalization is to have \"maybe medication adjustment and get better\".     R. Patient is placed on suicide and SIB precautions. She requested to have her diet changed to vegan, she said she will not eat regular food and forgot to discuss this with the provider earlier. Patient requested to have eye gtts for dryness if possible, said her eyes are really dry in the morning. She requested to use her mouthguard if possible ( will bring it tomorrow). She has a recently placed nose pearcing, and asked if she could have NS to flush the area daily. She asked for own home blanket.     "

## 2019-03-20 NOTE — PROGRESS NOTES
03/19/19 2318   Valuables   Patient Belongings locker   Patient Belongings Put in Hospital Secure Location (Security or Locker, etc.) glasses;clothing;cell phone/electronics   Did you bring any home meds/supplements to the hospital?  Yes     Locker: brown boots, pink bag, make up, book, blue phone, black glasses with black case, black coat, blue scarf    Security: None   A               Admission:  I am responsible for any personal items that are not sent to the safe or pharmacy.  [facility] is not responsible for loss, theft or damage of any property in my possession.    Signature:  _________________________________ Date: _______  Time: _____                                              Staff Signature:  ____________________________ Date: ________  Time: _____      2nd Staff person, if patient is unable/unwilling to sign:    Signature: ________________________________ Date: ________  Time: _____     Discharge:  [facility] has returned all of my personal belongings:    Signature: _________________________________ Date: ________  Time: _____                                          Staff Signature:  ____________________________ Date: ________  Time: _____

## 2019-03-20 NOTE — PHARMACY-ADMISSION MEDICATION HISTORY
"Admission medication history for the March 19, 2019 admission has been completed by pharmacy.     Interview sources:  patient with  present     Reliability of source: good. Very thorough     Medication compliance: good     Preferred Outpatient Pharmacy: [facility] in [place]     Additional medication history information:   - Adderall and clonazepam are old prescriptions (greater than 1 year old, no record on [place] ) that she uses \"sparingly\" when symptoms are severe. Dosages are self-reported since unable to confirm doses with any pharmacy. Patient states she has been taking clonazepam 1mg to 1.5mg daily the past week or so due to extreme anxiety.  States she typically takes Adderall once every few months when she needs to focus on job-related activities. Patient is adamant she does not misuse these medications.     - Patient reports she has kept in contact with her psychiatrist in [place] who verbally told her to increase her lamotrigine dose gradually up to 400mg/day, so patient has been doing this and is currently at 175mg PO BID (350mg/day).    - Haloperidol prescribed today (3/19) at outpatient appt.    Prior to Admission Medication List:  Prior to Admission medications    Medication Sig Last Dose Taking? Auth Provider   amphetamine-dextroamphetamine (ADDERALL) 5 MG tablet Take 10 mg by mouth daily as needed (focus)  Past Month Yes Reported, Patient   clonazePAM (KLONOPIN) 1 MG tablet Take 1-1.5 mg by mouth daily as needed for anxiety  3/19/2019 Yes Reported, Patient   lamoTRIgine (LAMICTAL) 150 MG tablet Take 175 mg by mouth 2 times daily 3/19/2019 at x1 Yes Unknown, Entered By History   haloperidol (HALDOL) 2 MG tablet Take 1 tablet (2 mg) by mouth At Bedtime for 14 days not started yet  George Monteiro MD     Time spent: 30 minutes    Medication history completed by:   Alejandra Perez, PharmD, [facility]  [facility]  Available daily from 1-9 PM: phone [phone], " ascom *[phone], pager [phone]

## 2019-03-20 NOTE — PLAN OF CARE
BEHAVIORAL TEAM DISCUSSION    Participants: Dr. Stokes, Attending; Dr. Mir, Resident; Dr. Polanco, Resident, Lindsay Petersen, [facility]; [facility], Lynne George, MS3  Progress: Initial  Continued Stay Criteria/Rationale: Depression / Anxiety  Medical/Physical: Deferred to medicine  Precautions:   Behavioral Orders   Procedures    Code 1 - Restrict to Unit    Routine Programming     As clinically indicated    Self Injury Precaution    Status 15     Every 15 minutes.   Plan: The plan is to assess and patient for mental health and medication needs.  The patient will be prescribed medications to treat the identified symptoms.  Upon discharge the patient will be referred to services as appropriate based on the assessment.  Rationale for change in precautions or plan: No change

## 2019-03-20 NOTE — PROGRESS NOTES
"Pt isolative to room, only visible in milieu when talking on the phone.  Pt states she's here for medication adjustments to become more stable.  Appears anxious, distractable.  Pt is embarrassed by episode and feels stressed about having visitors.  Denies any SI/SIB since admission.  Pt said \"I just want to be stabilized and get back to my life\".         03/20/19 1500   Behavioral Health   Hallucinations denies / not responding to hallucinations   Thinking distractable   Orientation person: oriented;place: oriented;date: oriented;time: oriented   Memory baseline memory   Insight insight appropriate to situation   Judgement impaired   Eye Contact at examiner   Affect full range affect   Mood anxious   Physical Appearance/Attire attire appropriate to age and situation   Hygiene well groomed   Suicidality other (see comments)  (denies)   1. Wish to be Dead No   2. Non-Specific Active Suicidal Thoughts  No   Self Injury other (see comment)  (denies)   Activity isolative   Speech clear;coherent   Medication Sensitivity no stated side effects;no observed side effects   Psychomotor / Gait balanced;steady   Psycho Education   Type of Intervention 1:1 intervention   Response participates, initiates socially appropriate   Hours 0.5   Treatment Detail check in   Activities of Daily Living   Hygiene/Grooming independent   Oral Hygiene independent   Dress independent   Room Organization independent     "

## 2019-03-20 NOTE — PROGRESS NOTES
"Initial Psychosocial Assessment    I have reviewed the chart, met with the patient, and developed Care Plan.      Patient Legal (Hospital) Status: Voluntary    Presenting Problem:  Per Patient: :\" I feel like I've been loosing control in the last 2 weeks, I need help\"    Per ED: Vasquez Ortiz is a 33 year old female who presents emergency department for medical clearance prior to psychiatric admission.  Patient was seen in psychiatry clinic today and they recommended admission for significant agitation, impulsive behavior, recent cutting.  Patient purged one time within the last 1-2 weeks.  She cut herself on her right lower leg with a razor 3 days ago.   had to restrain her to prevent her from cutting 10 days ago.  Patient reports recent panic attacks, feeling mood lability, significant anxiety, but denies suicidal ideation, homicidal ideation, auditory or visual hallucinations.  Denies significant alcohol or drug use.   provides much of the history as well.    Mental health history:   Prior Diagnoses: Depression, anorexia nervosa, possible OCD.  Past Hospitalizations: This is the patient's first hospitalization for .  Outpatient Services: Currently sees a psychiatrist here at [facility], history of two IOP for anorexia.  ECT/TMS: None  Court Commitment: None  Suicide attempts: Did not assess  Self-injurious behavior: Cut in teenage years, no cutting for many years until recently    Chemical use history:   Nicotine: Smoked cigarettes in HS and some in college; current nonsmoker  Alcohol: Drink \"a lot\" when first moved to [place], but minimal alcohol intake currently         Cannabis: None currently; caused paranoia in the past. Does use CBD oil  Others: Experimented with cocaine and psilocybin in the past  Prior CD treatments: none    Family Description ([facility], Family Psychiatric History):  Patient has been  for 12 years.     Significant Life Events (Illness, Abuse, Trauma, Death):  Patient denies " "any abuse/trauma or negative life events that may be impacting their mental health      Living Situation:  Patient resides with her  in [facility].  Paternal uncle with OCD and h/o psychiatric hospitalization. Depression on both sides of family. Gma abused \"pain pills\" and EtOH and likely had eating disorder, had depression.     Educational Background:  Currently enrolled in massage school.    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 Bermudez (Psychiatrist in [place]), and Dr. Monteiro at [facility]   Family Members: Johnny Soares/ - [phone]  - patient signed an TOMASA    Social Service Assessment/Social Functioning/Plan:  Patient has been admitted for psychiatric stabilization. Patient will have psychiatric assessment and medication management by the psychiatrist. Medications will be reviewed and adjusted per MD as indicated. The treatment team will continue to assess and stabilize the patient's mental health symptoms with the use of medications and therapeutic programming. Hospital staff will provide a safe environment and a therapeutic milieu. Staff will continue to assess patient as needed. Patient will participate in unit groups and activities. Patient will receive individual and group support on the unit.  CTC will do individual inpatient treatment planning and after care planning. CTC will discuss options for increasing community supports with the patient. CTC will coordinate with outpatient providers and will place referrals to ensure appropriate follow up care is in place.  Patient would benefit from: Medication management    "

## 2019-03-20 NOTE — PLAN OF CARE
This is patient's first  admission. She said she's had a partial hospitalization in the past. Patient admitted that's he has struggled with depression and anxiety since age 15. She admitted that's he started cutting self at age 15. Denied abuse. Patient reported having reaction to Cymbalta and Prozac in the past: said she was getting very happy and elated and than depressed and started cutting uncontrollably.     Patient is from [place], relocated to [place] 4 yrs ago due to her  school/education. 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 She said she had a panic attack during clinical. Patient reported decreased sleep. She said she worries about her 's heart health, he has had some heart problems. She said she is very afraid that he may die. Patient reported increasing anxiety and panic in the last yr or so. She said she gets angry very fast; at times she becomes very talkative and has a difficult time to stop talking. Patient said that's he has been isolating from and avoiding her family and friends. Patient said that she has a psychiatrist back in [place], and is in contact with her. Said she was told by her [place] doctor to increase Klonopin up to 1.5 mg per day. However, she ran out of Klonopin and when she went to get a refill, the pharmacist told her that they no longer accept scripts from out of state providers. So she got upset, when to her primary provider here, however, she was told that she should not have a refill, but was given 14 days supply. It sounded like this was patient's turning point leading to current decompensation.    Patient described that 2 weeks ago, she felt dizzy and her  told her to sit in the chair, she overreacted to this and started yelling, pulling her hair, and cutting. She said she felt out of control, her  attempted to stop  "her, she broke a glass. Patient said that 2 days ago, she again felt the same way and started cutting while in the shower. The next day, on Saturday, she started purging to stay in control.   Vasquez denied wishing to be dead or suicidal thoughts or urges to cut at the time of admission. However, she said she had urges to cut few times today. Patient reported history of suicidal thoughts in the past, but never planning or intending to do anything. Denied family history of suicides.   She rated depression at 6/10, anxiety 7/10, denied HI, hallucinations, or delusions/paranoia. Patient was willing to contract for safety.   Patient is hopeful to get better. Her goal for this hospitalization is to have \"maybe medication adjustment and get better\".   Patient is placed on suicide and SIB precautions. She requested to have her diet changed to vegan, she said she will not eat regular food and forgot to discuss this with the provider earlier. Patient requested to have eye gtts for dryness if possible, said her eyes are really dry in the morning. She requested to use her mouthguard if possible ( will bring it tomorrow). She has a recently placed nose pearcing, and asked if she could have NS to flush the area daily. She asked for own home blanket.   "

## 2019-03-21 LAB — LAMOTRIGINE SERPL-MCNC: 20.5 UG/ML (ref 2.5–15)

## 2019-03-21 PROCEDURE — 12400001 ZZH R&B MH UMMC

## 2019-03-21 PROCEDURE — 99232 SBSQ HOSP IP/OBS MODERATE 35: CPT | Mod: GC | Performed by: PSYCHIATRY & NEUROLOGY

## 2019-03-21 PROCEDURE — 25000132 ZZH RX MED GY IP 250 OP 250 PS 637: Performed by: STUDENT IN AN ORGANIZED HEALTH CARE EDUCATION/TRAINING PROGRAM

## 2019-03-21 PROCEDURE — 25000132 ZZH RX MED GY IP 250 OP 250 PS 637

## 2019-03-21 RX ORDER — LACTOBACILLUS RHAMNOSUS GG 10B CELL
1 CAPSULE ORAL EVERY MORNING
Status: DISCONTINUED | OUTPATIENT
Start: 2019-03-22 | End: 2019-03-26 | Stop reason: HOSPADM

## 2019-03-21 RX ADMIN — LAMOTRIGINE 175 MG: 150 TABLET ORAL at 09:32

## 2019-03-21 RX ADMIN — LAMOTRIGINE 175 MG: 150 TABLET ORAL at 21:48

## 2019-03-21 RX ADMIN — HYDROXYZINE HYDROCHLORIDE 25 MG: 25 TABLET ORAL at 13:04

## 2019-03-21 RX ADMIN — CLONAZEPAM 0.5 MG: 0.5 TABLET ORAL at 21:48

## 2019-03-21 RX ADMIN — LITHIUM CARBONATE 600 MG: 300 TABLET, EXTENDED RELEASE ORAL at 21:48

## 2019-03-21 ASSESSMENT — MIFFLIN-ST. JEOR: SCORE: 1119.12

## 2019-03-21 ASSESSMENT — ACTIVITIES OF DAILY LIVING (ADL)
DRESS: INDEPENDENT
HYGIENE/GROOMING: INDEPENDENT
ORAL_HYGIENE: INDEPENDENT

## 2019-03-21 NOTE — PROGRESS NOTES
"  Patient agitated, tearful and raising voice at beginning of shift. Writer talked with her at length about how she was feeling, reported feeling embarrassed to be here, reported feeling like she didn't deserve to be given help, reports guilt that people have to take care of her and that she doesn't deserve it. Reported feeling very agitated by her mother and that she patronizes her. Talked with patient about coping skills, pt agreed understanding.    Patient had taken Seroquel PRN earlier in shift for anxiety and reported she hated the way it made her feel, said it made her extremely tired and then didn't work. Writer offered and gave Vistaril PRN, patient calmed shortly after and reported that she feels that it worked well.     Patient's  in to visit along with family. Pt's  Johnny ([phone]), who is a psych resident out patient \"downstairs,\" spoke with writer at length about pt's care. TOMASA has been signed. He reported she has a long history of anorexia nervosa, spent several months in a facility and that she is now at baseline for it. Denied any SIB behavioral (in the past) related to swallowing things, but that has history of SIB related to cutting and that she does it on her thighs so it cannot be seen. Requested a form to fill out so it can be sent to her school alerting them that she is on medical leave. Stressed that form should say she is here for medical not psych. Reported that mother is a stressor to her and agitates her, cautions phone calls with the patient. He reported that if she does not eat to tell her we are going to call him and to call and tell him and he will call and get her to eat.  very involved in patient's care.     Wanted to know if Massage Therapist could come and visit patient and give her a massage, says she goes to Dr. TATTOFF and works with Rosa. Writer let  know MD would make decision regarding massage therapist.   "

## 2019-03-21 NOTE — PROGRESS NOTES
03/21/19 1400   Behavioral Health   Hallucinations denies / not responding to hallucinations   Thinking intact   Orientation person: oriented;place: oriented;date: oriented;time: oriented   Memory baseline memory   Insight insight appropriate to situation   Judgement intact   Eye Contact at examiner   Affect full range affect   Mood mood is calm   Physical Appearance/Attire attire appropriate to age and situation   Hygiene well groomed   1. Wish to be Dead No   2. Non-Specific Active Suicidal Thoughts  No   Enviromental Risk Factors None   Self Injury thoughts only   Elopement (None indicated)   Activity other (see comment)  (Present in the milieu)   Speech clear;coherent   Medication Sensitivity no observed side effects;no stated side effects   Psychomotor / Gait balanced;steady     The patient had a decent shift. She spent a lot of the shift on the phone and did not participate in groups. She is feeling very guilty about being hospitalized and tearfully explained the stressors that led her to hospitalization; including her 's recent medical diagnosis.  She admitted to having passing thoughts of self harm that did not involve any urges or a plan. She denied wanting to be dead and states that she feels very supported.

## 2019-03-21 NOTE — PROGRESS NOTES
"    ----------------------------------------------------------------------------------------------------------  [facility], [place]   Psychiatric Progress Note  Hospital Day #2     Interim History:   The patient's care was discussed with the treatment team and chart notes were reviewed.    Sleep: Not recorded  Scheduled Medications: Taken as prescribed  PRNs: Quetiapine 12.5mg PO once, hydroxyzine 25mg PO once (declined second dose of quetiapine in favor of hydroxyzine)  Staff Report: Agitated, tearful, and raising voice in the evening. Reported feeling embarrassed to be here. Reported feeling like she didn't deserve to be given help. Reported guilt that people have to take care of her. Reported being agitated and patronized by her mother. Took quetiapine prn, but did not like the way it made her feel. Stated that it made her extremely tired and didn't work. Took hydroxyzine instead. Felt that it worked well.      and family visited.  requested a form to send to her school notifying them that she is on medical leave. Stressed that form should state that she is here for medical reasons, not psychiatric reasons.  endorsed that her mother is a stressor to her and cautions phone calls between them.  instructed that if she does not eat to tell her that they will contact him. Call him and he will call and get her to eat. Wanted to know if a massage therapist could come to give her a massage.     Patient Interview: Patient was interviewed in her room. She is feeling \"a little better\" today.  She had a difficult time yesterday because she was feeling very guilty about being in the hospital when there are so many people that have more serious conditions than her that need help. She spoke with multiple family members yesterday. She described her mother telling her that she \"doesn't have to feel guilty,\" which she described as being unhelpful because she already feels " "guilty. She has been avoiding talking to the other patients on the unit because she feels so guilty. She also worries about losing her temper and hurting someone. She describes reading, taking hot showers or baths, and yoga as some of the ways that she has coped with her emotions in the past. She is amenable to trying to use some of these techniques to calm herself while she's in the hospital. She is also amenable to learning about other coping skills. She described being \"knocked out\" by the Seroquel yesterday. She doesn't want to continue taking it for that reason. She is amenable to using the hydroxyzine instead. She had many questions about her medications, including the amount of time it would take to see their effects, the duration that she could expect to be on them, and the side effects that she should look out for. She asked if her  could bring her yoga mat and tea from home. We discussed resuming some of her home vitamins as well as a probiotic. She was amenable to this and will ask her  to bring her vitamins in so that we can order the correct ones from the pharmacy. She is also amenable to having a zinc level drawn at the recommendation of Nutrition.    The risks, benefits, alternatives and side effects of any medication changes have been discussed and are understood by the patient and other caregivers.    Review of systems:     ROS was negative unless noted above.          Allergies:     Allergies   Allergen Reactions     Prozac [Fluoxetine] Other (See Comments)     Elena/depression     Duloxetine Other (See Comments)     hypomania            Psychiatric Examination:   /73   Pulse 75   Temp 98.2  F (36.8  C) (Oral)   Resp 18   Ht 1.575 m (5' 2\")   Wt 45.6 kg (100 lb 8 oz)   SpO2 99%   BMI 18.38 kg/m    Weight is 100 lbs 8 oz  Body mass index is 18.38 kg/m .    Appearance:  adequately groomed, dressed in hospital scrubs and appeared as age stated  Attitude:  cooperative  Eye " "Contact:  good  Mood:  \"a little better\"  Affect:  mood incongruent, generally restricted to an anxious affect, intermittently tearful  Speech:  clear, coherent  Psychomotor Behavior:  no evidence of tardive dyskinesia, dystonia, or tics  Thought Process:  logical, somewhat circumstantial, goal oriented  Associations:  no loose associations  Thought Content:  no evidence of suicidal ideation or homicidal ideation and no evidence of psychotic thought  Insight:  good  Judgment:  intact  Oriented to:  time, person, and place  Attention Span and Concentration:  intact  Recent and Remote Memory:  intact  Language: speaks fluent English with appropriate syntax and vocabulary  Fund of Knowledge: good  Muscle Strength and Tone: normal  Gait and Station: normal         Labs:     Sodium   Date Value Ref Range Status   03/19/2019 142 133 - 144 mmol/L Final     Potassium   Date Value Ref Range Status   03/19/2019 4.0 3.4 - 5.3 mmol/L Final     Chloride   Date Value Ref Range Status   03/19/2019 105 94 - 109 mmol/L Final     Carbon Dioxide   Date Value Ref Range Status   03/19/2019 31 20 - 32 mmol/L Final     Anion Gap   Date Value Ref Range Status   03/19/2019 6 3 - 14 mmol/L Final     Glucose   Date Value Ref Range Status   03/19/2019 77 70 - 99 mg/dL Final     Urea Nitrogen   Date Value Ref Range Status   03/19/2019 5 (L) 7 - 30 mg/dL Final     Creatinine   Date Value Ref Range Status   03/19/2019 0.79 0.52 - 1.04 mg/dL Final     GFR Estimate   Date Value Ref Range Status   03/19/2019 >90 >60 mL/min/[1.73_m2] Final     Comment:     Non  GFR Calc  Starting 12/18/2018, serum creatinine based estimated GFR (eGFR) will be   calculated using the Chronic Kidney Disease Epidemiology Collaboration   (CKD-EPI) equation.       Calcium   Date Value Ref Range Status   03/19/2019 9.4 8.5 - 10.1 mg/dL Final     Bilirubin Total   Date Value Ref Range Status   03/19/2019 0.4 0.2 - 1.3 mg/dL Final     Alkaline Phosphatase "   Date Value Ref Range Status   03/19/2019 90 40 - 150 U/L Final     ALT   Date Value Ref Range Status   03/19/2019 24 0 - 50 U/L Final     AST   Date Value Ref Range Status   03/19/2019 19 0 - 45 U/L Final     Cholesterol   Date Value Ref Range Status   03/20/2019 187 <200 mg/dL Final     HDL Cholesterol   Date Value Ref Range Status   03/20/2019 89 >49 mg/dL Final     LDL Cholesterol Calculated   Date Value Ref Range Status   03/20/2019 85 <100 mg/dL Final     Comment:     Desirable:       <100 mg/dl     Triglycerides   Date Value Ref Range Status   03/20/2019 63 <150 mg/dL Final     No results found for: CHOLHDLRATIO     Lab Results   Component Value Date    WBC 5.1 03/19/2019     Lab Results   Component Value Date    RBC 4.46 03/19/2019     Lab Results   Component Value Date    HGB 13.4 03/19/2019     Lab Results   Component Value Date    HCT 41.1 03/19/2019     No components found for: MCT  Lab Results   Component Value Date    MCV 92 03/19/2019     Lab Results   Component Value Date    MCH 30.0 03/19/2019     Lab Results   Component Value Date    MCHC 32.6 03/19/2019     Lab Results   Component Value Date    RDW 12.4 03/19/2019     Lab Results   Component Value Date     03/19/2019     Lamotrigine level pending, zinc level ordered, lithium level ordered for 3/25 in the am     Assessment    Diagnostic Impression: Vasquez Ortiz is a 33 year old woman with a past psychiatric history of depression and anorexia admitted with 2 weeks of mood lability and self-injurious behavior. Biological contributors include family history of OCD (paternal uncle) and depression, and substance abuse (grandma). Psychological contributors include past diagnosis of depression and anorexia, along with difficulties identifying and utilizing coping mechanisms during periods of emotional dysregulation. 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 Differential diagnosis includes  bipolar type 1 disorder with mixed episode, unipolar depression, and an unspecified anxiety disorder. Given her history of early and severe depressive episodes, likely manic or hypomanic episodes during previous serotonin specific reuptake inhibitor trials, and a family history of mental illness, the leading diagnosis is bipolar type 1 disorder with mixed episode. However, there is some ambiguity if her previous episodes represent virgilio or hypomania. Further collateral information is needed to delineate underlying etiologies, including anxiety or personality disorders.    Hospital course: Vasquez Ortiz was admitted to station 22 as a voluntary patient on 3/19/19. PTA Haldol was started, later discontinued favoring other pharmacotherapy. Lithium was started for mood stabilization. Quetiapine was started to target anxiety and depression in the setting of a major depressive episode with a likely underlying bipolar I disorder. Due to intolerance of sedation side effect, quetiapine was discontinued in favor of hydroxyzine for anxiety.    Discontinued Medications (& Rationale):   - Haldol, in favor of other pharmacotherapy  - Quetiapine, sedation intolerable to pt    Medical course: Vasquez was medically cleared for admission to the psychiatric unit. Admission labs, including CBC, CMP, lipid panel, TSH, urine toxicology, and ECG, were unremarkable. Lamotrigine level pending. Zinc level ordered.    Plan   Principal Diagnosis:   # Unspecified bipolar disorder, likely type I  # unspecified anxiety disorder  # R/o personality disorder    Secondary psychiatric diagnoses of concern this admission:   # Anorexia nervosa  # R/o OCD    Psychotropic Medications:  Modify:  - Discontinue quetiapine 12.5mg PO QID prn  - Start Culturelle probiotic 1 capsule PO QAM  - Discontinue diphenhydramine 50mg IM Q6H PRN for severe EPSE    Continue:  - Lithium 600mg PO at bedtime (extended release)  - Lamotrigine 175mg PO BID  - Hydroxyzine 25mg Q4H  PRN  - Benztropine 0.5mg Q2H PRN for EPSE  - Trazodone 50mg at bedtime PRN  - Haldol 2mg PO/5mg IM for severe agitation/aggression for virgilio/psychosis     Patient will be treated in therapeutic milieu with appropriate individual and group therapies as described.      Medical diagnoses to be addressed this admission:    # GI/FEN  - PRN Maalox and milk of magnesia     # Neuro  - APAP 650mg Q4H PRN    Data: As above  Consults: None    Legal Status: Voluntary    Safety Assessment:   Behavioral Orders   Procedures     Code 1 - Restrict to Unit     Routine Programming     As clinically indicated     Self Injury Precaution     Status 15     Every 15 minutes.       Disposition: Pending clinical stabilization, medication adjustments, and formulation of safe discharge plan.    This assessment and plan was made in conjunction with Dr. Mir and Dr. Stokes.  Lynne George, MS3    I have reviewed and edited the documentation recorded by the medical student.  This documentation accurately reflects the services I personally performed and treatment decisions made by me in consultation with the attending physician Corwin Stokes MD.    Matthew Mir MD  Psychiatry PGY-1  [phone]    Psychiatry Attending Attestation:  This patient has been seen and evaluated by me, Corwin Stokes M.D.  The patient's condition and treatment plan were discussed with the resident, and care coordinated with the CTC and RN. I reviewed, edited and agree with the findings and plan in this note.     Corwin Stokes M.D.   of Psychiatry

## 2019-03-21 NOTE — PROGRESS NOTES
Isolative to room most of shift.  Anxious and tense. On phone at times making requests for a number it items. Had a number of visitors. Two large bags brought in. Presented clamer after visit.       03/20/19 2100   Behavioral Health   Hallucinations denies / not responding to hallucinations   Thinking distractable;poor concentration   Orientation person: oriented;place: oriented;date: oriented;time: oriented   Memory baseline memory   Insight poor   Judgement impaired   Eye Contact at examiner   Affect tense   Mood anxious   Physical Appearance/Attire attire appropriate to age and situation   Hygiene well groomed   1. Wish to be Dead No   2. Non-Specific Active Suicidal Thoughts  No   Activity isolative   Speech clear;coherent   Psychomotor / Gait balanced;steady   Activities of Daily Living   Hygiene/Grooming independent   Oral Hygiene independent   Dress independent   Laundry with supervision   Room Organization independent

## 2019-03-22 ENCOUNTER — HEALTH MAINTENANCE LETTER (OUTPATIENT)
Age: 34
End: 2019-03-22

## 2019-03-22 LAB — INTERPRETATION ECG - MUSE: NORMAL

## 2019-03-22 PROCEDURE — 36415 COLL VENOUS BLD VENIPUNCTURE: CPT | Performed by: STUDENT IN AN ORGANIZED HEALTH CARE EDUCATION/TRAINING PROGRAM

## 2019-03-22 PROCEDURE — 25000132 ZZH RX MED GY IP 250 OP 250 PS 637: Performed by: STUDENT IN AN ORGANIZED HEALTH CARE EDUCATION/TRAINING PROGRAM

## 2019-03-22 PROCEDURE — 25000132 ZZH RX MED GY IP 250 OP 250 PS 637

## 2019-03-22 PROCEDURE — 12400001 ZZH R&B MH UMMC

## 2019-03-22 PROCEDURE — 84630 ASSAY OF ZINC: CPT | Performed by: STUDENT IN AN ORGANIZED HEALTH CARE EDUCATION/TRAINING PROGRAM

## 2019-03-22 RX ADMIN — LITHIUM CARBONATE 600 MG: 300 TABLET, EXTENDED RELEASE ORAL at 22:03

## 2019-03-22 RX ADMIN — LAMOTRIGINE 175 MG: 150 TABLET ORAL at 22:02

## 2019-03-22 RX ADMIN — HYDROXYZINE HYDROCHLORIDE 25 MG: 25 TABLET ORAL at 17:55

## 2019-03-22 RX ADMIN — TRAZODONE HYDROCHLORIDE 50 MG: 50 TABLET ORAL at 22:07

## 2019-03-22 RX ADMIN — LAMOTRIGINE 175 MG: 150 TABLET ORAL at 08:58

## 2019-03-22 RX ADMIN — HYDROXYZINE HYDROCHLORIDE 25 MG: 25 TABLET ORAL at 11:50

## 2019-03-22 RX ADMIN — Medication 1 CAPSULE: at 08:58

## 2019-03-22 RX ADMIN — CLONAZEPAM 0.5 MG: 0.5 TABLET ORAL at 22:02

## 2019-03-22 ASSESSMENT — ACTIVITIES OF DAILY LIVING (ADL)
ORAL_HYGIENE: INDEPENDENT
LAUNDRY: WITH SUPERVISION
DRESS: INDEPENDENT
HYGIENE/GROOMING: INDEPENDENT

## 2019-03-22 NOTE — PROGRESS NOTES
"Pt was observed withdrawn to their room much of the evening but was present in the milieu during meals and to visit with their family. During check-in with writer pt stated that early in the evening their anxiety was rated a 8 or 9 on a scale of 1-10. Pt attributed this to feeling like they were unable to stay awake while reading. Pt said that while they felt they were falling asleep it still felt like they were somewhat awake and they could not fully fall asleep or be fully awake. Pt stated that later in the evening during check-in their anxiety was a 7 on a scale of 1-10 and were hoping that their night time medication would assist in lowering their anxiety soon. Pt explained that they felt overwhelmed during visiting as they were visited by many people and felt guilty for not being able to give each individual person attention. Pt also stated that they were feeling guilty for being here as they feel other people who are in worse condition than them may need the care more. Pt denies SI stating \"no, I don't want to die I just want to feel good\".      03/21/19 2217   Behavioral Health   Hallucinations denies / not responding to hallucinations   Thinking distractable   Orientation person: oriented;place: oriented;date: oriented   Memory baseline memory   Insight poor   Judgement impaired   Eye Contact at examiner   Affect sad   Mood anxious   Physical Appearance/Attire attire appropriate to age and situation   Hygiene well groomed   Suicidality (Contracted for safety on the unit)   1. Wish to be Dead No   2. Non-Specific Active Suicidal Thoughts  No   Self Injury (Pt denies)   Elopement (None stated or observed)   Activity withdrawn   Speech coherent;clear   Medication Sensitivity sedation   Psychomotor / Gait balanced;steady   Psycho Education   Type of Intervention 1:1 intervention   Response participates, initiates socially appropriate   Hours 0.5   Treatment Detail Check-in   Activities of Daily Living "   Hygiene/Grooming independent   Oral Hygiene independent   Dress independent   Room Organization independent

## 2019-03-22 NOTE — PROGRESS NOTES
Patient anxious and tearful early morning. Calmer as day progressed. No groups. Napping this afternoon.         03/22/19 1419   Behavioral Health   Hallucinations denies / not responding to hallucinations   Thinking distractable;poor concentration   Orientation person: oriented;place: oriented;date: oriented;time: oriented   Memory baseline memory   Insight admits / accepts;insight appropriate to situation   Judgement impaired   Affect incongruent   Mood labile;anxious   Physical Appearance/Attire attire appropriate to age and situation;neat   1. Wish to be Dead No   Activity restless   Speech pressured

## 2019-03-22 NOTE — PROGRESS NOTES
Vasquez's , Johnny Soares, called with concerns about Vasquez. His phone number is [phone]. He worries that Vasquez will decompensate regarding her eating disorder. She apparently told him on the phone that she is not eating because she doesn't like hospital food. He says that being in the hospital concerns him that she may get worse instead of better. He is concerned that she is bored. He has tried to explain to her that she is here for medication titrations and these can be done best in the hospital. However, he notes that she is a little confused. He requests to speak to the resident tonight if possible during visiting time. Plan is to call MD to notify him of request.

## 2019-03-23 PROCEDURE — 25000132 ZZH RX MED GY IP 250 OP 250 PS 637: Performed by: STUDENT IN AN ORGANIZED HEALTH CARE EDUCATION/TRAINING PROGRAM

## 2019-03-23 PROCEDURE — 12400001 ZZH R&B MH UMMC

## 2019-03-23 PROCEDURE — 25000132 ZZH RX MED GY IP 250 OP 250 PS 637

## 2019-03-23 RX ADMIN — Medication 1 CAPSULE: at 09:15

## 2019-03-23 RX ADMIN — LAMOTRIGINE 175 MG: 150 TABLET ORAL at 09:15

## 2019-03-23 RX ADMIN — LAMOTRIGINE 175 MG: 150 TABLET ORAL at 21:24

## 2019-03-23 RX ADMIN — HYDROXYZINE HYDROCHLORIDE 25 MG: 25 TABLET ORAL at 12:11

## 2019-03-23 RX ADMIN — HYDROXYZINE HYDROCHLORIDE 25 MG: 25 TABLET ORAL at 18:27

## 2019-03-23 RX ADMIN — CLONAZEPAM 0.5 MG: 0.5 TABLET ORAL at 21:24

## 2019-03-23 RX ADMIN — TRAZODONE HYDROCHLORIDE 50 MG: 50 TABLET ORAL at 21:24

## 2019-03-23 RX ADMIN — LITHIUM CARBONATE 600 MG: 300 TABLET, EXTENDED RELEASE ORAL at 21:24

## 2019-03-23 ASSESSMENT — ACTIVITIES OF DAILY LIVING (ADL)
LAUNDRY: WITH SUPERVISION
HYGIENE/GROOMING: INDEPENDENT
ORAL_HYGIENE: INDEPENDENT
LAUNDRY: WITH SUPERVISION
ORAL_HYGIENE: INDEPENDENT
DRESS: INDEPENDENT
DRESS: INDEPENDENT
HYGIENE/GROOMING: INDEPENDENT

## 2019-03-23 NOTE — PROGRESS NOTES
Brief cross cover note    S: I met with the patient's , both alone and with the patient, to discuss the patient's progress and concerns. There has been concern of her eating disorder symptoms worsening in the hospital, particularly as the patient does not like the hospital food. Patient and  are wondering about options to allow the patient to have her personal food stored in her room. They also had questions about the goals of hospitalization, and the likely length of her hospital stay. Discussed that a lithium level will be drawn Monday, which will help guide her final lithium dose. Also discussed goals of symptom improvement prior to discharge.    A/P: 34 yo female with a history of depression and anorexia nervosa, now with likely bipolar 1 disorder with mixed episode.   - Discussed with nursing staff. Okay for patient to have non-perishable food in room, as long as it is in a closed container. Also okay for patient to have access to more of her clothes in her room.   - Lithium level to be drawn Monday. Patient is agreeable to staying in the hospital for mediation titration      Calvin Blair MD, Psychiatry PGY2

## 2019-03-23 NOTE — PROGRESS NOTES
Patient in room resting early in the shift. Reported that she feels restless and needed to make-up for the loss of sleep. Reports improved mood and stated that anxious is partially managed. However, depression coutinues to be at the same rate then prior (8/10). Denied SI/SIB and all other psychotic symptoms.        03/22/19 3229   Behavioral Health   Hallucinations denies / not responding to hallucinations   Thinking distractable   Orientation person: oriented;place: oriented;date: oriented;time: oriented   Memory baseline memory   Insight admits / accepts   Judgement impaired   Eye Contact at examiner   Affect full range affect   Mood anxious   Physical Appearance/Attire appears stated age   Hygiene well groomed   Suicidality other (see comments)  (denied )   1. Wish to be Dead No   2. Non-Specific Active Suicidal Thoughts  No   Self Injury (denied )   Elopement (none observed/mentioned )   Activity withdrawn;restless   Speech clear;coherent   Medication Sensitivity no stated side effects   Psychomotor / Gait balanced;steady   Psycho Education   Type of Intervention 1:1 intervention   Response participates, initiates socially appropriate   Hours 0.5   Treatment Detail (check-in)   Safety   Suicidality Status 15   Activities of Daily Living   Hygiene/Grooming independent   Oral Hygiene independent   Dress independent   Laundry with supervision   Room Organization independent   Groups   Details (did not attend groups)

## 2019-03-24 LAB — ZINC SERPL-MCNC: 61 UG/DL (ref 60–120)

## 2019-03-24 PROCEDURE — 12400001 ZZH R&B MH UMMC

## 2019-03-24 PROCEDURE — 25000132 ZZH RX MED GY IP 250 OP 250 PS 637: Performed by: STUDENT IN AN ORGANIZED HEALTH CARE EDUCATION/TRAINING PROGRAM

## 2019-03-24 PROCEDURE — 25000132 ZZH RX MED GY IP 250 OP 250 PS 637

## 2019-03-24 RX ADMIN — HYDROXYZINE HYDROCHLORIDE 25 MG: 25 TABLET ORAL at 09:57

## 2019-03-24 RX ADMIN — CLONAZEPAM 0.5 MG: 0.5 TABLET ORAL at 21:16

## 2019-03-24 RX ADMIN — LAMOTRIGINE 175 MG: 150 TABLET ORAL at 21:16

## 2019-03-24 RX ADMIN — HYDROXYZINE HYDROCHLORIDE 25 MG: 25 TABLET ORAL at 12:57

## 2019-03-24 RX ADMIN — HYDROXYZINE HYDROCHLORIDE 25 MG: 25 TABLET ORAL at 18:33

## 2019-03-24 RX ADMIN — LAMOTRIGINE 175 MG: 150 TABLET ORAL at 09:56

## 2019-03-24 RX ADMIN — Medication 1 CAPSULE: at 09:56

## 2019-03-24 RX ADMIN — LITHIUM CARBONATE 600 MG: 300 TABLET, EXTENDED RELEASE ORAL at 21:16

## 2019-03-24 ASSESSMENT — ACTIVITIES OF DAILY LIVING (ADL)
ORAL_HYGIENE: INDEPENDENT
HYGIENE/GROOMING: INDEPENDENT
LAUNDRY: WITH SUPERVISION
LAUNDRY: WITH SUPERVISION
ORAL_HYGIENE: INDEPENDENT
DRESS: INDEPENDENT
HYGIENE/GROOMING: INDEPENDENT
DRESS: INDEPENDENT

## 2019-03-24 NOTE — PROGRESS NOTES
Pt was visible in the milieu and social with peers. Pt stated her depression and anxiety have improved. She stated her hydroxyzine has help her today, as well as taking it before visits with friends and family. Pt did not eat breakfast of lunch, although had a bowl of cereal with a banana in the evening. Pt reports that she hard a hard time sleeping last night.      03/24/19 1403   Behavioral Health   Hallucinations denies / not responding to hallucinations   Thinking distractable   Orientation person: oriented;place: oriented;time: oriented;date: oriented   Memory baseline memory   Insight admits / accepts   Judgement impaired   Eye Contact at examiner   Affect full range affect   Mood anxious;depressed   Physical Appearance/Attire attire appropriate to age and situation   Hygiene well groomed   Suicidality other (see comments)  (denies)   1. Wish to be Dead No   2. Non-Specific Active Suicidal Thoughts  No   Self Injury other (see comment)  (denies)   Activity other (see comment)  (visible in the milieu )   Speech clear;coherent   Medication Sensitivity no stated side effects;no observed side effects   Psychomotor / Gait balanced;steady   Psycho Education   Type of Intervention 1:1 intervention   Response participates, initiates socially appropriate   Hours 0.5   Activities of Daily Living   Hygiene/Grooming independent   Oral Hygiene independent   Dress independent   Laundry with supervision   Room Organization independent   Activity   Activity Assistance Provided independent

## 2019-03-24 NOTE — PLAN OF CARE
"Vasquez came for meds @ 1000.  She said that it was very hard to get to sleep and she was surprised because she had taken Trazadone and Klonopin.  \"I got up to look at the clock and it was (after 2300).  I am used to going to bed around 1200 so maybe that is why.\".  Yesterday she asked that she not be awakened for breakfast but allowed to sleep, which she did.  She requested Vistaril for anxiety, her anxiety appeared low but would rev up around tasks (waiting for med packages to be opened).  She stated that her urine had been smelling different since Lithium started and she was offered a hand out on lithium which she was glad for.  Staff told her it would be dropped off in her room.  \"Oh don't do that.  I will be using the bathroom.  I'll just swing by later.\".  Her remark was reasonable and the quick alert about not a good time seemed higher on anxiety scale. Yesterday she thought she was doing better (for a good portion of the day shift).  She restricted calls yesterday to after 1000 and before 1900.  She said that it helped her to set limits on her activity and anxiety levels.      Vasquez's father, Arthur Ortiz, [phone] called and wants to speak with the psychiatrist on the Green Team after Vasquez has been seen by the team.  He wants to know if he is right in his thinking that   Vasquez may be able to discharge if lithium level shows good titration - perhaps on Tuesday.  He stated that he wanted to be direct with the psychiatrist as he senses that Vasquez's  may find all the calls overwhelming.    "

## 2019-03-24 NOTE — PLAN OF CARE
Patient isolative to room much of shift. She reports a great deal of anxiety . She requests prns and is afraid that it will make her fall asleep. She attempts to be in the lounge and read but is more anxious with the tv. She is easily overwhelmed with all the phone calls coming in for her. Encouraged patient to set boundaries on how many calls and how many visitors she wants. Patient was able to select a timeframe today that she would not take phone calls. Her thoughts are clear and organized. She is med compliant. Denies SI and SIB. Naheed Holliday RN

## 2019-03-25 LAB — LITHIUM SERPL-SCNC: 0.77 MMOL/L (ref 0.6–1.2)

## 2019-03-25 PROCEDURE — 12400001 ZZH R&B MH UMMC

## 2019-03-25 PROCEDURE — 36415 COLL VENOUS BLD VENIPUNCTURE: CPT | Performed by: STUDENT IN AN ORGANIZED HEALTH CARE EDUCATION/TRAINING PROGRAM

## 2019-03-25 PROCEDURE — 25000132 ZZH RX MED GY IP 250 OP 250 PS 637: Performed by: STUDENT IN AN ORGANIZED HEALTH CARE EDUCATION/TRAINING PROGRAM

## 2019-03-25 PROCEDURE — 80178 ASSAY OF LITHIUM: CPT | Performed by: STUDENT IN AN ORGANIZED HEALTH CARE EDUCATION/TRAINING PROGRAM

## 2019-03-25 PROCEDURE — 25000132 ZZH RX MED GY IP 250 OP 250 PS 637

## 2019-03-25 PROCEDURE — 99232 SBSQ HOSP IP/OBS MODERATE 35: CPT | Mod: GC | Performed by: PSYCHIATRY & NEUROLOGY

## 2019-03-25 RX ORDER — LAMOTRIGINE 150 MG/1
150 TABLET ORAL 2 TIMES DAILY
Status: DISCONTINUED | OUTPATIENT
Start: 2019-03-25 | End: 2019-03-26 | Stop reason: HOSPADM

## 2019-03-25 RX ADMIN — CLONAZEPAM 0.5 MG: 0.5 TABLET ORAL at 22:14

## 2019-03-25 RX ADMIN — Medication 1 CAPSULE: at 09:02

## 2019-03-25 RX ADMIN — LITHIUM CARBONATE 600 MG: 300 TABLET, EXTENDED RELEASE ORAL at 22:15

## 2019-03-25 RX ADMIN — LAMOTRIGINE 150 MG: 150 TABLET ORAL at 22:14

## 2019-03-25 RX ADMIN — Medication 12.5 MG: at 20:40

## 2019-03-25 RX ADMIN — LAMOTRIGINE 175 MG: 150 TABLET ORAL at 09:02

## 2019-03-25 ASSESSMENT — ACTIVITIES OF DAILY LIVING (ADL)
ORAL_HYGIENE: INDEPENDENT
ORAL_HYGIENE: INDEPENDENT
HYGIENE/GROOMING: HANDWASHING;SHOWER;INDEPENDENT
LAUNDRY: WITH SUPERVISION
HYGIENE/GROOMING: INDEPENDENT
DRESS: INDEPENDENT
DRESS: STREET CLOTHES;INDEPENDENT
LAUNDRY: WITH SUPERVISION

## 2019-03-25 NOTE — DISCHARGE INSTRUCTIONS
Behavioral Discharge Planning and Instructions      Summary:  You were admitted on 3/19/2019  due to Anxiety.  You were treated by Dr. Corwin Stokes MD and discharged on 3/26/19 from [facility] to home to follow up with outpatient providers.    Principal Diagnosis:   Bipolar 1, current episode mixed, vs emotional dysregulation  Hx of anorexia nervosa  R/O OCD    Health Care Follow-up Appointments:      Dr. Alfonso- Monday April 1st 11am (Therapy)   Dr. Cox- Monday April 9th 9am(Medication Management)  [address] [address] Phone: [phone]   Fax: [phone]  Attend all scheduled appointments with your outpatient providers. Call at least 24 hours in advance if you need to reschedule an appointment to ensure continued access to your outpatient providers.   Major Treatments, Procedures and Findings:  You were provided with: a psychiatric assessment, assessed for medical stability, medication evaluation and/or management, group therapy and milieu management    Symptoms to Report: Feeling more aggressive, increased confusion, losing more sleep, mood getting worse or thoughts of suicide    Early warning signs can include: Increased depression or anxiety sleep disturbances increased thoughts or behaviors of suicide or self-harm  increased unusual thinking, such as paranoia or hearing voices    Safety and Wellness:  Take all medicines as directed.  Make no changes unless your doctor suggests them.      Follow treatment recommendations.  Refrain from alcohol and non-prescribed drugs.  Ask your support system to help you reduce your access to items that could harm yourself or others. If there is a concern for safety, call 911.    Resources:   Crisis Intervention: 867.518.3270 or 369-579-7448 (TTY: 265.289.2263).  Call anytime for help.  National [place] on Mental Illness (www.[place].farzana.org): 368.330.3727 or 930-348-4754.  Suicide Awareness Voices of Education (SAVE)  (www.save.org): 888-511-SAVE (7283)  National Suicide Prevention Line (www.mentalhealthmn.org): 765-831-ZBGX (8894)  Mental Health Consumer/Survivor Network of [place] (www.mhcsn.net): 943.973.5727 or 161-693-8558  Mental Health Association of [place] (www.mentalhealth.org): 895.500.7258 or 938-100-4573    The treatment team has appreciated the opportunity to work with you.     If you have any questions or concerns our unit number is [phone]  You may be receiving a follow-up phone call within the next three days from a representative from behavioral health.

## 2019-03-25 NOTE — PROGRESS NOTES
Pt mostly isolative to room, observed in milieu when on the phone.  Pt states being a little confused.  Pt only ate a PB&J sandwich and drank a soy milk.  No SI/SIB/HI observed.       03/25/19 1400   Behavioral Health   Hallucinations denies / not responding to hallucinations   Thinking confused;distractable   Orientation person: oriented;place: oriented;date: oriented;time: oriented   Memory baseline memory   Insight admits / accepts   Judgement impaired   Eye Contact at examiner   Affect full range affect   Mood anxious   Physical Appearance/Attire attire appropriate to age and situation   Hygiene well groomed   Suicidality other (see comments)  (none observed)   1. Wish to be Dead No   2. Non-Specific Active Suicidal Thoughts  No   Self Injury other (see comment)  (none observed)   Activity isolative   Speech clear;coherent   Medication Sensitivity no observed side effects   Psychomotor / Gait balanced;steady   Psycho Education   Type of Intervention 1:1 intervention   Response observes from a distance   Activities of Daily Living   Hygiene/Grooming independent   Oral Hygiene independent   Dress independent   Laundry with supervision   Room Organization independent

## 2019-03-25 NOTE — PLAN OF CARE
INITIAL OT NOTE  OT Goal 1    Pt attended about x20 minutes of occupational therapy clinic (no charge). Pt was oriented to task materials, and selected tactile sensory materials to fidget with. Social with peers and staff throughout group. 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 Pleasant with a bright affect on approach. Will continue to assess. Pt will be given self-assessment form, and OT staff will explain the purpose of including them in their treatment plan and offer options for meeting their needs. Initial assessment to be completed upon additional group participation.

## 2019-03-25 NOTE — PROGRESS NOTES
Pt was visible in the milieu, social with peers, spend time pacing the joe with peers. Pt reported a good visit with her , and improvement in her mood. Pt stated she is confused about what medication is helping her because she is on lithium and has been using her prn hydroxyzine. Pt denies SI/SIB, hallucinations, reports some dry mouth as a possible side affect.     03/24/19 2109   Behavioral Health   Hallucinations denies / not responding to hallucinations   Thinking distractable   Orientation person: oriented;place: oriented;date: oriented;time: oriented   Memory baseline memory   Insight admits / accepts   Judgement impaired   Eye Contact at examiner   Affect full range affect   Mood anxious   Physical Appearance/Attire attire appropriate to age and situation   Hygiene well groomed   Suicidality other (see comments)  (denies)   1. Wish to be Dead No   2. Non-Specific Active Suicidal Thoughts  No   Self Injury other (see comment)  (denies)   Activity other (see comment)  (active in the milieu)   Speech clear;coherent   Medication Sensitivity no observed side effects;no stated side effects   Psychomotor / Gait balanced;steady   Psycho Education   Type of Intervention 1:1 intervention   Response participates, initiates socially appropriate   Hours 0.5   Activities of Daily Living   Hygiene/Grooming independent   Oral Hygiene independent   Dress independent   Laundry with supervision   Room Organization independent   Activity   Activity Assistance Provided independent

## 2019-03-25 NOTE — PROGRESS NOTES
"    ----------------------------------------------------------------------------------------------------------  [facility], [place]   Psychiatric Progress Note  Hospital Day #6     Interim History:   The patient's care was discussed with the treatment team and chart notes were reviewed.    Sleep: 6.5h  Scheduled Medications: Taken as prescribed  PRNs: Hydroxyzine x2  Staff Report: No acute events over the weekend. Reported intermittent anxiety. Difficulty sleeping    Patient Interview: Patient was interviewed in her room. Vasquez stated that, overall, her symptoms have improved since admission. She is unsure if the hydroxyzine or lithium has helped the most to reduce her anxiety. She stated \"I haven't had any 'monster' episodes since I have been here, which is progress.\" She states she has used the hydroxyzine during emotional dysregulation, and when she anticipates encounters when she may become anxious. She was interested in trying quetiapine again as needed to see if the medication indeed is too sedating, or may be another option for her. We discussed plans for OP treatment, including a new OP psychiatrist and therapist. Her lithium and lamotrigine levels were communicated to her, and she was agreeable to medication changes. She was concerned about continuing to take clonazepam long term. She requested her  and father be updated over the phone. She had no additional concerns or questions at this time.    The risks, benefits, alternatives and side effects of any medication changes have been discussed and are understood by the patient and other caregivers.    Review of systems:     ROS was negative unless noted above.          Allergies:     Allergies   Allergen Reactions     Prozac [Fluoxetine] Other (See Comments)     Elena/depression     Duloxetine Other (See Comments)     hypomania            Psychiatric Examination:   BP 99/65   Pulse 80   Temp 97.8  F (36.6  C) (Oral)   " "Resp 18   Ht 1.575 m (5' 2\")   Wt 45.6 kg (100 lb 8 oz)   SpO2 100%   BMI 18.38 kg/m    Weight is 100 lbs 8 oz  Body mass index is 18.38 kg/m .    Appearance:  adequately groomed, dressed in hospital scrubs and appeared as age stated  Attitude:  cooperative  Eye Contact:  good  Mood:  \"a little better\"  Affect:  mood incongruent, generally restricted to an anxious affect, intermittently tearful  Speech:  clear, coherent  Psychomotor Behavior:  no evidence of tardive dyskinesia, dystonia, or tics  Thought Process:  logical, somewhat circumstantial, goal oriented  Associations:  no loose associations  Thought Content:  no evidence of suicidal ideation or homicidal ideation and no evidence of psychotic thought  Insight:  good  Judgment:  intact  Oriented to:  time, person, and place  Attention Span and Concentration:  intact  Recent and Remote Memory:  intact  Language: speaks fluent English with appropriate syntax and vocabulary  Fund of Knowledge: good  Muscle Strength and Tone: normal  Gait and Station: normal         Labs:     Lamotrigine: 20.5 micrograms/mL  Lithium: 0.77 mmol/L  Zinc: 61 micrograms/dL     Assessment    Diagnostic Impression: Vasquez Ortiz is a 33 year old woman with a past psychiatric history of depression and anorexia admitted with 2 weeks of mood lability and self-injurious behavior. Biological contributors include family history of OCD (paternal uncle) and depression, and substance abuse (grandma). Psychological contributors include past diagnosis of depression and anorexia, along with difficulties identifying and utilizing coping mechanisms during periods of emotional dysregulation. 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 Differential diagnosis includes bipolar type 1 disorder with mixed episode, unipolar depression, and an unspecified anxiety disorder. Given her history of early and severe depressive episodes, likely manic or " hypomanic episodes during previous serotonin specific reuptake inhibitor trials, and a family history of mental illness, the leading diagnosis is bipolar type 1 disorder with mixed episode. However, there is some ambiguity if her previous episodes represent virgilio or hypomania. Further collateral information is needed to delineate underlying etiologies, including anxiety or personality disorders.    Hospital course: Vasquez Ortiz was [facility] as a voluntary patient on 3/19/19. PTA Haldol was started, later discontinued favoring other pharmacotherapy. Lithium was started and titrated to a dose of 600mg for mood stabilization. Steady state lithium level 0.77. Quetiapine was started to target anxiety and depression in the setting of a major depressive episode with a likely underlying bipolar I disorder. Quetiapine was momentarily stopped due to profound sedation, later trialed again as a PRN option. Hydroxyzine was used as needed for anxiety. Lamotrigine level elevated at 20.5. Lamotrigine dose decreased to 150mg BID.    Discontinued Medications (& Rationale):   - Haldol, in favor of other pharmacotherapy  - Quetiapine, sedation intolerable to pt    Medical course: Vasquez was medically cleared for admission to the psychiatric unit. Admission labs, including CBC, CMP, lipid panel, TSH, urine toxicology, and ECG, were unremarkable. Zinc wnl.    Plan   Principal Diagnosis:   # Unspecified bipolar disorder, likely type I  # Unspecified anxiety disorder  # R/o personality disorder    Secondary psychiatric diagnoses of concern this admission:   # Anorexia nervosa  # R/o OCD    Psychotropic Medications:  Modify:  - Restart quetiapine 12.5mg PO QID prn  - Decrease lamotrigine 150mg PO BID    Continue:  - Lithium ER 600mg PO at bedtime   - Hydroxyzine 25mg Q4H PRN  - Benztropine 0.5mg Q2H PRN for EPSE  - Trazodone 50mg at bedtime PRN  - Haldol 2mg PO/5mg IM for severe agitation/aggression for virgilio/psychosis      Patient will be treated in therapeutic milieu with appropriate individual and group therapies as described.    Medical diagnoses to be addressed this admission:    # GI/FEN  - PRN Maalox and milk of magnesia  - Culturelle probiotic 1 capsule PO QAM     # Neuro  - APAP 650mg Q4H PRN    Data: As above  Consults: None    Legal Status: Voluntary    Safety Assessment:   Behavioral Orders   Procedures     Code 1 - Restrict to Unit     Routine Programming     As clinically indicated     Self Injury Precaution     Status 15     Every 15 minutes.       Disposition: Pending clinical stabilization, medication adjustments, and formulation of safe discharge plan.    This documentation accurately reflects the services I personally performed and treatment decisions made by me in consultation with the attending physician Corwin Stokes MD.    Matthew Mir MD  Psychiatry PGY-1  [phone]    Psychiatry Attending Attestation:

## 2019-03-26 VITALS
SYSTOLIC BLOOD PRESSURE: 118 MMHG | DIASTOLIC BLOOD PRESSURE: 78 MMHG | RESPIRATION RATE: 16 BRPM | WEIGHT: 100.5 LBS | OXYGEN SATURATION: 100 % | HEART RATE: 71 BPM | HEIGHT: 62 IN | TEMPERATURE: 98.2 F | BODY MASS INDEX: 18.5 KG/M2

## 2019-03-26 PROCEDURE — 99238 HOSP IP/OBS DSCHRG MGMT 30/<: CPT | Mod: GC | Performed by: PSYCHIATRY & NEUROLOGY

## 2019-03-26 PROCEDURE — 25000132 ZZH RX MED GY IP 250 OP 250 PS 637: Performed by: STUDENT IN AN ORGANIZED HEALTH CARE EDUCATION/TRAINING PROGRAM

## 2019-03-26 RX ORDER — LITHIUM CARBONATE 300 MG/1
600 TABLET, FILM COATED, EXTENDED RELEASE ORAL AT BEDTIME
Qty: 60 TABLET | Refills: 0 | Status: SHIPPED | OUTPATIENT
Start: 2019-03-26 | End: 2019-04-25

## 2019-03-26 RX ORDER — LAMOTRIGINE 150 MG/1
150 TABLET ORAL 2 TIMES DAILY
Qty: 60 TABLET | Refills: 0 | Status: SHIPPED | OUTPATIENT
Start: 2019-03-26 | End: 2019-04-25

## 2019-03-26 RX ORDER — CLONAZEPAM 0.5 MG/1
0.5 TABLET ORAL DAILY PRN
Qty: 14 TABLET | Refills: 0 | Status: SHIPPED | OUTPATIENT
Start: 2019-03-26

## 2019-03-26 RX ORDER — QUETIAPINE FUMARATE 25 MG/1
12.5 TABLET, FILM COATED ORAL 4 TIMES DAILY PRN
Qty: 60 TABLET | Refills: 0 | Status: SHIPPED | OUTPATIENT
Start: 2019-03-26 | End: 2019-04-25

## 2019-03-26 RX ORDER — HYDROXYZINE HYDROCHLORIDE 25 MG/1
25 TABLET, FILM COATED ORAL EVERY 4 HOURS PRN
Qty: 120 TABLET | Refills: 0 | Status: SHIPPED | OUTPATIENT
Start: 2019-03-26 | End: 2019-04-25

## 2019-03-26 RX ADMIN — LAMOTRIGINE 150 MG: 150 TABLET ORAL at 09:36

## 2019-03-26 RX ADMIN — Medication 12.5 MG: at 09:36

## 2019-03-26 RX ADMIN — Medication 1 CAPSULE: at 09:37

## 2019-03-26 ASSESSMENT — ACTIVITIES OF DAILY LIVING (ADL)
HYGIENE/GROOMING: INDEPENDENT
DRESS: INDEPENDENT
ORAL_HYGIENE: INDEPENDENT
LAUNDRY: WITH SUPERVISION

## 2019-03-26 ASSESSMENT — MIFFLIN-ST. JEOR: SCORE: 1119.12
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
## 2019-03-26 NOTE — PROGRESS NOTES
"    ----------------------------------------------------------------------------------------------------------  [facility], [place]   Psychiatric Progress Note  Hospital Day #6     Interim History:   The patient's care was discussed with the treatment team and chart notes were reviewed.    Sleep: 6.5h  Scheduled Medications: Taken as prescribed  PRNs: Hydroxyzine x2  Staff Report: No acute events over the weekend. Reported intermittent anxiety. Difficulty sleeping    Patient Interview: Patient was interviewed in her room. Vasquez stated that, overall, her symptoms have improved since admission. She is unsure if the hydroxyzine or lithium has helped the most to reduce her anxiety. She stated \"I haven't had any 'monster' episodes since I have been here, which is progress.\" She states she has used the hydroxyzine during emotional dysregulation, and when she anticipates encounters when she may become anxious. She was interested in trying quetiapine again as needed to see if the medication indeed is too sedating, or may be another option for her. We discussed plans for OP treatment, including a new OP psychiatrist and therapist. Her lithium and lamotrigine levels were communicated to her, and she was agreeable to medication changes. She was concerned about continuing to take clonazepam long term. She requested her  and father be updated over the phone. She had no additional concerns or questions at this time.    During a later meeting, Vasquez confirmed the above improvement over the weekend noting considerable slowing of her thoughts, though she sometimes feels \"spacy\" and overmedicated, especially after using multiple doses of hydroxyzine. I [SCO] reinforced the advantages of low-dose quetiapine over hydroxyzine and that decisions about further reductions in lamotrigine and clonazepam should be made after she is more stable and back in her home environment. I encouraged her to arrange a " "therapist who was affliated or worked with her new psychiatrist at [facility]. [Colby DURHAM]    The risks, benefits, alternatives and side effects of any medication changes have been discussed and are understood by the patient and other caregivers.    Review of systems:     ROS was negative unless noted above.          Allergies:     Allergies   Allergen Reactions     Prozac [Fluoxetine] Other (See Comments)     Elena/depression     Duloxetine Other (See Comments)     hypomania            Psychiatric Examination:   BP 99/65   Pulse 80   Temp 97.8  F (36.6  C) (Oral)   Resp 18   Ht 1.575 m (5' 2\")   Wt 45.6 kg (100 lb 8 oz)   SpO2 100%   BMI 18.38 kg/m    Weight is 100 lbs 8 oz  Body mass index is 18.38 kg/m .    Appearance:  adequately groomed, dressed in hospital scrubs and appeared as age stated  Attitude:  cooperative  Eye Contact:  good  Mood:  \"a little better\"  Affect:  mood incongruent, generally restricted to an anxious affect, intermittently tearful  Speech:  clear, coherent  Psychomotor Behavior:  no evidence of tardive dyskinesia, dystonia, or tics  Thought Process:  logical, somewhat circumstantial, goal oriented  Associations:  no loose associations  Thought Content:  no evidence of suicidal ideation or homicidal ideation and no evidence of psychotic thought  Insight:  good  Judgment:  intact  Oriented to:  time, person, and place  Attention Span and Concentration:  intact  Recent and Remote Memory:  intact  Language: speaks fluent English with appropriate syntax and vocabulary  Fund of Knowledge: good  Muscle Strength and Tone: normal  Gait and Station: normal         Labs:     Lamotrigine: 20.5 micrograms/mL  Lithium: 0.77 mmol/L  Zinc: 61 micrograms/dL     Assessment    Diagnostic Impression: Vasquez Ortiz is a 33 year old woman with a past psychiatric history of depression and anorexia admitted with 2 weeks of mood lability and self-injurious behavior. Biological contributors include family " history of OCD (paternal uncle) and depression, and substance abuse (grandma). Psychological contributors include past diagnosis of depression and anorexia, along with difficulties identifying and utilizing coping mechanisms during periods of emotional dysregulation. 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 Differential diagnosis includes bipolar type 1 disorder with mixed episode, unipolar depression, and an unspecified anxiety disorder. Given her history of early and severe depressive episodes, likely manic or hypomanic episodes during previous serotonin specific reuptake inhibitor trials, and a family history of mental illness, the leading diagnosis is bipolar type 1 disorder with mixed episode. However, there is some ambiguity if her previous episodes represent virgilio or hypomania. Further collateral information is needed to delineate underlying etiologies, including anxiety or personality disorders.    Hospital course: Vasquez Ortiz was admitted to [facility] as a voluntary patient on 3/19/19. PTA Haldol was started, later discontinued favoring other pharmacotherapy. Lithium was started and titrated to a dose of 600mg for mood stabilization. Steady state lithium level 0.77 mEq/L. Quetiapine was started to target anxiety and depression in the setting of a major depressive episode with a likely underlying bipolar I disorder. Quetiapine was momentarily stopped due to profound sedation, later trialed again as a PRN option. Hydroxyzine was used as needed for anxiety. Lamotrigine level on 175 mg BID elevated at 20.5, possibly contributing to mixed presentation, so lamotrigine dose decreased to 150mg BID.    Discontinued Medications (& Rationale):   - Haldol, in favor of other pharmacotherapy  - Quetiapine 12.5 mg, sedation after single dose intolerable to pt, later agreed to try again    Medical course: Vasquez was medically cleared for admission to the  psychiatric unit. Admission labs, including CBC, CMP, lipid panel, TSH, urine toxicology, and ECG, were unremarkable. Zinc wnl.    Plan   Principal Diagnosis:   #  Bipolar I disorder,mixed episode  # Unspecified anxiety disorder  # R/o personality disorder    Secondary psychiatric diagnoses of concern this admission:   # Anorexia nervosa  # R/o OCD    Psychotropic Medications:  Modify:  - Restart quetiapine 12.5mg PO QID prn  - Decrease lamotrigine 150mg PO BID    Continue:  - Lithium ER 600mg PO at bedtime   - Hydroxyzine 25mg Q4H PRN  - Benztropine 0.5mg Q2H PRN for EPSE  - Trazodone 50mg at bedtime PRN  - Haldol 2mg PO/5mg IM for severe agitation/aggression for virgilio/psychosis     Patient will be treated in therapeutic milieu with appropriate individual and group therapies as described.    Medical diagnoses to be addressed this admission:    # GI/FEN  - PRN Maalox and milk of magnesia  - Culturelle probiotic 1 capsule PO QAM     # Neuro  - APAP 650mg Q4H PRN    Data: As above  Consults: None    Legal Status: Voluntary    Safety Assessment:   Behavioral Orders   Procedures     Code 1 - Restrict to Unit     Routine Programming     As clinically indicated     Self Injury Precaution     Status 15     Every 15 minutes.       Disposition: Pending clinical stabilization, medication adjustments, and formulation of safe discharge plan.    This documentation accurately reflects the services I personally performed and treatment decisions made by me in consultation with the attending physician Corwin Stokes MD.    Matthew Mir MD  Psychiatry PGY-1  [phone]    Psychiatry Attending Attestation:  This patient has been seen and evaluated by me, Corwin Stokes M.D.  The patient's condition and treatment plan were discussed with the resident, and care coordinated with the CTC and RN. I reviewed, edited and agree with the findings and plan in this note.     Corwin Stokes M.D.   of Psychiatry

## 2019-03-26 NOTE — PLAN OF CARE
Vasquez discharged 1410 care of sister to return home-states she does continue anxious, but feels Seroquel PRN has been helpful-denies SI-reviewed medication schedule and outpt tx plan and verbalizes understanding-has printed instructions in her possession-30 day supply of medications sent with her with exception of Lamictal which was present in home supply and returned to her on discharge

## 2019-03-26 NOTE — PLAN OF CARE
"Vasquez's friends or family visited this evening. She said that she is being discharged tomorrow. She ate a couple of CHARMS PPEC[employer] sandwiches this evening and drank an Orgain shake. She recently approached [facility] room tearful R/T anxiety re: discharge tomorrow. She wanted to try the seroquel PRN and then return to her room to \"chill.\" She would like to have her friend pick her up around noon tomorrow. Denies SI/SIB. No hallucinations.   "

## 2019-03-27 ENCOUNTER — TELEPHONE (OUTPATIENT)
Dept: PSYCHIATRY | Facility: CLINIC | Age: 34
End: 2019-03-27

## 2019-03-27 ENCOUNTER — TELEPHONE (OUTPATIENT)
Dept: PHARMACY | Facility: OTHER | Age: 34
End: 2019-03-27

## 2019-03-27 NOTE — TELEPHONE ENCOUNTER
On 3/19/2019 the patient signed an TOMASA authorizing medical records to be released from [employer] to Dr. Maddie Chaidez for the purpose of continuing care, insurance, personal use, disability and legal per TOMASA. This writer sent the original  TOMASA to Medical Records via the tube system and kept a copy in psychiatry until scanning is complete/confirmed. Cady Bahena MA

## 2019-04-26 DIAGNOSIS — F31.73 MANIC BIPOLAR I DISORDER IN PARTIAL REMISSION (H): Primary | ICD-10-CM

## 2019-04-26 DIAGNOSIS — F31.73 MANIC BIPOLAR I DISORDER IN PARTIAL REMISSION (H): ICD-10-CM

## 2019-04-26 LAB — LITHIUM SERPL-SCNC: 0.69 MMOL/L (ref 0.6–1.2)

## 2019-04-26 PROCEDURE — 36415 COLL VENOUS BLD VENIPUNCTURE: CPT | Performed by: INTERNAL MEDICINE

## 2019-04-26 PROCEDURE — 80178 ASSAY OF LITHIUM: CPT | Performed by: INTERNAL MEDICINE

## 2020-01-27 ENCOUNTER — OFFICE VISIT (OUTPATIENT)
Dept: FAMILY MEDICINE | Facility: CLINIC | Age: 35
End: 2020-01-27
Payer: COMMERCIAL

## 2020-01-27 ENCOUNTER — MEDICAL CORRESPONDENCE (OUTPATIENT)
Dept: HEALTH INFORMATION MANAGEMENT | Facility: CLINIC | Age: 35
End: 2020-01-27

## 2020-01-27 VITALS — DIASTOLIC BLOOD PRESSURE: 66 MMHG | TEMPERATURE: 98.5 F | SYSTOLIC BLOOD PRESSURE: 101 MMHG

## 2020-01-27 DIAGNOSIS — Z71.84 TRAVEL ADVICE ENCOUNTER: Primary | ICD-10-CM

## 2020-01-27 DIAGNOSIS — F31.73 BIPOLAR I DISORDER, MOST RECENT EPISODE MANIC, IN PARTIAL REMISSION (H): ICD-10-CM

## 2020-01-27 PROCEDURE — 90632 HEPA VACCINE ADULT IM: CPT | Mod: GA | Performed by: NURSE PRACTITIONER

## 2020-01-27 PROCEDURE — 36415 COLL VENOUS BLD VENIPUNCTURE: CPT | Performed by: PSYCHIATRY & NEUROLOGY

## 2020-01-27 PROCEDURE — 80048 BASIC METABOLIC PNL TOTAL CA: CPT | Performed by: PSYCHIATRY & NEUROLOGY

## 2020-01-27 PROCEDURE — 80178 ASSAY OF LITHIUM: CPT | Performed by: PSYCHIATRY & NEUROLOGY

## 2020-01-27 PROCEDURE — 90691 TYPHOID VACCINE IM: CPT | Mod: GA | Performed by: NURSE PRACTITIONER

## 2020-01-27 PROCEDURE — 90472 IMMUNIZATION ADMIN EACH ADD: CPT | Mod: GA | Performed by: NURSE PRACTITIONER

## 2020-01-27 PROCEDURE — 84443 ASSAY THYROID STIM HORMONE: CPT | Performed by: PSYCHIATRY & NEUROLOGY

## 2020-01-27 PROCEDURE — 90471 IMMUNIZATION ADMIN: CPT | Mod: GA | Performed by: NURSE PRACTITIONER

## 2020-01-27 PROCEDURE — 99402 PREV MED CNSL INDIV APPRX 30: CPT | Mod: 25 | Performed by: NURSE PRACTITIONER

## 2020-01-27 RX ORDER — AMOXICILLIN 500 MG/1
500 CAPSULE ORAL 2 TIMES DAILY
Qty: 14 CAPSULE | Refills: 0 | Status: SHIPPED | OUTPATIENT
Start: 2020-01-27

## 2020-01-27 RX ORDER — AZITHROMYCIN 500 MG/1
500 TABLET, FILM COATED ORAL DAILY
Qty: 3 TABLET | Refills: 0 | Status: SHIPPED | OUTPATIENT
Start: 2020-01-27 | End: 2020-01-30

## 2020-01-27 NOTE — NURSING NOTE
"Chief Complaint   Patient presents with     [facility]     initial /66 (BP Location: Left arm, Cuff Size: Adult Regular)   Temp 98.5  F (36.9  C) (Oral)   LMP 01/15/2020  Estimated body mass index is 18.38 kg/m  as calculated from the following:    Height as of 3/19/19: 1.575 m (5' 2\").    Weight as of 3/26/19: 45.6 kg (100 lb 8 oz).  BP completed using cuff size: regular.  L  arm      Health Maintenance that is potentially due pending provider review:  NONE    n/a    Mitchell Meehan ma  "

## 2020-01-27 NOTE — PROGRESS NOTES
Nurse Note      Itinerary:  [place] and [place]      Departure Date: 2/6/20      Return Date: 2/16/20      Length of Trip 10 days      Reason for Travel: Tourism           Urban or rural: both      Accommodations: Hotel        IMMUNIZATION HISTORY  Have you received any immunizations within the past 4 weeks?  No  Have you ever fainted from having your blood drawn or from an injection?  No  Have you ever had a fever reaction to vaccination?  No  Have you ever had any bad reaction or side effect from any vaccination?  No  Have you ever had hepatitis A or B vaccine?  Yes  Do you live (or work closely) with anyone who has AIDS, an AIDS-like condition, any other immune disorder or who is on chemotherapy for cancer?  No  Do you have a family history of immunodeficiency?  No  Have you received any injection of immune globulin or any blood products during the past 12 months?  No    Patient roomed by Mitchell Dean  Vasquez Ortiz is a 34 year old female seen today with spouse for counsultation for international travel to the stated countries.   Patient will be departing in  1 week(s)  Patient itinerary :  [place] and then to [place] which presents risk for Dengue Fever. exposure.      Patient's activities will include sightseeing and beach activities (salt water).    Patient's country of birth is [place]    Special medical concerns: Anxiety/depression  Being monitiored  Pre-travel questionnaire was completed by patient and reviewed by provider.     Vitals: /66 (BP Location: Left arm, Cuff Size: Adult Regular)   Temp 98.5  F (36.9  C) (Oral)   LMP 01/15/2020   BMI= There is no height or weight on file to calculate BMI.    EXAM:  General:  Well-nourished, well-developed in no acute distress.  Appears to be stated age, interacts appropriately and expresses understanding of information given to patient.    Current Outpatient Medications   Medication Sig Dispense Refill     clonazePAM  (KLONOPIN) 0.5 MG tablet Take 1 tablet (0.5 mg) by mouth daily as needed for anxiety 14 tablet 0     lamoTRIgine (LAMICTAL) 150 MG tablet Take 1 tablet (150 mg) by mouth 2 times daily 60 tablet 0     lithium ER (LITHOBID) 300 MG CR tablet Take 2 tablets (600 mg) by mouth At Bedtime 60 tablet 0     QUEtiapine (SEROQUEL) 25 MG tablet Take 0.5 tablets (12.5 mg) by mouth 4 times daily as needed (Anxiety) 60 tablet 0     Patient Active Problem List   Diagnosis     Self-injurious behavior     Allergies   Allergen Reactions     Prozac [Fluoxetine] Other (See Comments)     Elena/depression     Duloxetine Other (See Comments)     hypomania         Immunizations discussed include:   Hepatitis A:  Ordered/given today, risks, benefits and side effects reviewed  Hepatitis B: Up to date  Influenza: Up to date  Typhoid: Ordered/given today, risks, benefits and side effects reviewed  Rabies: Declined  reviewed managment of a animal bite or scratch (washing wound, seek medical care within 24 hours for post exposure prophylaxis ) and Insufficient time to vaccinate  Yellow Fever: Not indicated  Japanese Encephalitis: Not indicated  Meningococcus: Not indicated  Tetanus/Diphtheria: Up to date  Measles/Mumps/Rubella: Up to date  Cholera: Not needed  Polio: Up to date  Pneumococcal: Under age of 65  Varicella: immune  Zostavax:  Not indicated  Shingrix: Not indicated  HPV:  Up to date  TB:  lw risk     Altitude Exposure on this trip: no  Past tolerance to Altitude: na    ASSESSMENT/PLAN:    ICD-10-CM    1. Travel advice encounter Z71.84 azithromycin (ZITHROMAX) 500 MG tablet     amoxicillin (AMOXIL) 500 MG capsule     Basic metabolic panel  (Ca, Cl, CO2, Creat, Gluc, K, Na, BUN)     TSH     Lithium level   2. Bipolar I disorder, most recent episode manic, in partial remission (H) F31.73 Basic metabolic panel  (Ca, Cl, CO2, Creat, Gluc, K, Na, BUN)     TSH     Lithium level     I have reviewed general recommendations for safe travel    including: food/water precautions, insect precautions, safer sex   practices given high prevalence of Zika, HIV and other STDs,   roadway safety. Educational materials and [facility] report provided.    Malaraia prophylaxis recommended: Malarone  Symptomatic treatment for traveler's diarrhea: azithromycin  Altitude illness prevention and treatment: none  For other infections during travel : Amox       Evacuation insurance advised and resources were provided to patient.    Total visit time 30 minutes  with over 50% of time spent counseling patient as detailed above.    Opal Ivory CNP

## 2020-01-27 NOTE — PATIENT INSTRUCTIONS
Today January 27, 2020 you received the    Hepatitis A Vaccine -     Typhoid - injectable. This vaccine is valid for two years.   .    These appointments can be made as a NURSE ONLY visit.    **It is very important for the vaccinations to be given on the scheduled day(s), this helps ensure you receive the full effectiveness of the vaccine.**    Please call [facility] with any questions [phone]    Thank you for visiting [facility]

## 2020-01-28 LAB
ANION GAP SERPL CALCULATED.3IONS-SCNC: 9 MMOL/L (ref 3–14)
BUN SERPL-MCNC: 10 MG/DL (ref 7–30)
CALCIUM SERPL-MCNC: 9.6 MG/DL (ref 8.5–10.1)
CHLORIDE SERPL-SCNC: 105 MMOL/L (ref 94–109)
CO2 SERPL-SCNC: 24 MMOL/L (ref 20–32)
CREAT SERPL-MCNC: 0.78 MG/DL (ref 0.52–1.04)
GFR SERPL CREATININE-BSD FRML MDRD: >90 ML/MIN/{1.73_M2}
GLUCOSE SERPL-MCNC: 76 MG/DL (ref 70–99)
LITHIUM SERPL-SCNC: 0.69 MMOL/L (ref 0.6–1.2)
POTASSIUM SERPL-SCNC: 3.7 MMOL/L (ref 3.4–5.3)
SODIUM SERPL-SCNC: 138 MMOL/L (ref 133–144)
TSH SERPL DL<=0.005 MIU/L-ACNC: 2.26 MU/L (ref 0.4–4)

## 2020-03-10 ENCOUNTER — HEALTH MAINTENANCE LETTER (OUTPATIENT)
Age: 35
End: 2020-03-10

## 2020-06-30 ENCOUNTER — MEDICAL CORRESPONDENCE (OUTPATIENT)
Dept: HEALTH INFORMATION MANAGEMENT | Facility: CLINIC | Age: 35
End: 2020-06-30

## 2020-06-30 DIAGNOSIS — F31.73 BIPOLAR DISORDER, IN PARTIAL REMISSION, MOST RECENT EPISODE MANIC (H): Primary | ICD-10-CM

## 2020-07-31 ENCOUNTER — TRANSFERRED RECORDS (OUTPATIENT)
Dept: HEALTH INFORMATION MANAGEMENT | Facility: CLINIC | Age: 35
End: 2020-07-31

## 2020-07-31 LAB — TSH SERPL-ACNC: 3.55 UIU/ML (ref 0.45–4.5)

## 2020-12-27 ENCOUNTER — HEALTH MAINTENANCE LETTER (OUTPATIENT)
Age: 35
End: 2020-12-27

## 2021-04-24 ENCOUNTER — HEALTH MAINTENANCE LETTER (OUTPATIENT)
Age: 36
End: 2021-04-24

## 2021-10-09 ENCOUNTER — HEALTH MAINTENANCE LETTER (OUTPATIENT)
Age: 36
End: 2021-10-09

## 2022-05-16 ENCOUNTER — HEALTH MAINTENANCE LETTER (OUTPATIENT)
Age: 37
End: 2022-05-16

## 2022-09-11 ENCOUNTER — HEALTH MAINTENANCE LETTER (OUTPATIENT)
Age: 37
End: 2022-09-11

## 2023-06-03 ENCOUNTER — HEALTH MAINTENANCE LETTER (OUTPATIENT)
Age: 38
End: 2023-06-03